# Patient Record
Sex: MALE | Race: WHITE | NOT HISPANIC OR LATINO | Employment: FULL TIME | URBAN - METROPOLITAN AREA
[De-identification: names, ages, dates, MRNs, and addresses within clinical notes are randomized per-mention and may not be internally consistent; named-entity substitution may affect disease eponyms.]

---

## 2022-11-03 ENCOUNTER — HOSPITAL ENCOUNTER (EMERGENCY)
Facility: HOSPITAL | Age: 42
Discharge: ANOTHER HOSPITAL | End: 2022-11-04
Attending: GENERAL ACUTE CARE HOSPITAL
Payer: MEDICAID

## 2022-11-03 DIAGNOSIS — L03.211 FACIAL CELLULITIS: Primary | ICD-10-CM

## 2022-11-03 LAB
ALBUMIN SERPL-MCNC: 3.8 GM/DL (ref 3.5–5)
ALBUMIN/GLOB SERPL: 0.9 RATIO (ref 1.1–2)
ALP SERPL-CCNC: 101 UNIT/L (ref 40–150)
ALT SERPL-CCNC: 18 UNIT/L (ref 0–55)
AST SERPL-CCNC: 17 UNIT/L (ref 5–34)
BASOPHILS # BLD AUTO: 0.06 X10(3)/MCL (ref 0–0.2)
BASOPHILS NFR BLD AUTO: 0.5 %
BILIRUBIN DIRECT+TOT PNL SERPL-MCNC: 0.4 MG/DL
BUN SERPL-MCNC: 15 MG/DL (ref 8.9–20.6)
CALCIUM SERPL-MCNC: 10 MG/DL (ref 8.4–10.2)
CHLORIDE SERPL-SCNC: 101 MMOL/L (ref 98–107)
CO2 SERPL-SCNC: 26 MMOL/L (ref 22–29)
CREAT SERPL-MCNC: 0.86 MG/DL (ref 0.73–1.18)
EOSINOPHIL # BLD AUTO: 0.13 X10(3)/MCL (ref 0–0.9)
EOSINOPHIL NFR BLD AUTO: 1 %
ERYTHROCYTE [DISTWIDTH] IN BLOOD BY AUTOMATED COUNT: 11.7 % (ref 11.5–17)
ERYTHROCYTE [SEDIMENTATION RATE] IN BLOOD: 18 MM/HR (ref 0–15)
GFR SERPLBLD CREATININE-BSD FMLA CKD-EPI: >60 MLS/MIN/1.73/M2
GLOBULIN SER-MCNC: 4.2 GM/DL (ref 2.4–3.5)
GLUCOSE SERPL-MCNC: 109 MG/DL (ref 74–100)
HCT VFR BLD AUTO: 43.2 % (ref 42–52)
HGB BLD-MCNC: 14.9 GM/DL (ref 14–18)
IMM GRANULOCYTES # BLD AUTO: 0.07 X10(3)/MCL (ref 0–0.04)
IMM GRANULOCYTES NFR BLD AUTO: 0.6 %
LACTATE SERPL-SCNC: 1.1 MMOL/L (ref 0.5–2.2)
LYMPHOCYTES # BLD AUTO: 1.53 X10(3)/MCL (ref 0.6–4.6)
LYMPHOCYTES NFR BLD AUTO: 12.2 %
MCH RBC QN AUTO: 31.7 PG (ref 27–31)
MCHC RBC AUTO-ENTMCNC: 34.5 MG/DL (ref 33–36)
MCV RBC AUTO: 91.9 FL (ref 80–94)
MONOCYTES # BLD AUTO: 1.12 X10(3)/MCL (ref 0.1–1.3)
MONOCYTES NFR BLD AUTO: 8.9 %
NEUTROPHILS # BLD AUTO: 9.7 X10(3)/MCL (ref 2.1–9.2)
NEUTROPHILS NFR BLD AUTO: 76.8 %
PLATELET # BLD AUTO: 244 X10(3)/MCL (ref 130–400)
PMV BLD AUTO: 8.7 FL (ref 7.4–10.4)
POTASSIUM SERPL-SCNC: 3.7 MMOL/L (ref 3.5–5.1)
PROT SERPL-MCNC: 8 GM/DL (ref 6.4–8.3)
RBC # BLD AUTO: 4.7 X10(6)/MCL (ref 4.7–6.1)
SODIUM SERPL-SCNC: 139 MMOL/L (ref 136–145)
WBC # SPEC AUTO: 12.6 X10(3)/MCL (ref 4.5–11.5)

## 2022-11-03 PROCEDURE — 85025 COMPLETE CBC W/AUTO DIFF WBC: CPT | Performed by: GENERAL ACUTE CARE HOSPITAL

## 2022-11-03 PROCEDURE — 83605 ASSAY OF LACTIC ACID: CPT | Performed by: GENERAL ACUTE CARE HOSPITAL

## 2022-11-03 PROCEDURE — 25000003 PHARM REV CODE 250: Performed by: GENERAL ACUTE CARE HOSPITAL

## 2022-11-03 PROCEDURE — 85651 RBC SED RATE NONAUTOMATED: CPT | Performed by: GENERAL ACUTE CARE HOSPITAL

## 2022-11-03 PROCEDURE — 80053 COMPREHEN METABOLIC PANEL: CPT | Performed by: GENERAL ACUTE CARE HOSPITAL

## 2022-11-03 PROCEDURE — 96365 THER/PROPH/DIAG IV INF INIT: CPT

## 2022-11-03 PROCEDURE — 87040 BLOOD CULTURE FOR BACTERIA: CPT | Performed by: GENERAL ACUTE CARE HOSPITAL

## 2022-11-03 PROCEDURE — 96375 TX/PRO/DX INJ NEW DRUG ADDON: CPT

## 2022-11-03 PROCEDURE — 99285 EMERGENCY DEPT VISIT HI MDM: CPT | Mod: 25

## 2022-11-03 PROCEDURE — 86141 C-REACTIVE PROTEIN HS: CPT | Performed by: GENERAL ACUTE CARE HOSPITAL

## 2022-11-03 PROCEDURE — 36415 COLL VENOUS BLD VENIPUNCTURE: CPT | Performed by: GENERAL ACUTE CARE HOSPITAL

## 2022-11-03 RX ORDER — CLINDAMYCIN PHOSPHATE 600 MG/50ML
600 INJECTION, SOLUTION INTRAVENOUS
Status: COMPLETED | OUTPATIENT
Start: 2022-11-03 | End: 2022-11-04

## 2022-11-03 RX ADMIN — CLINDAMYCIN PHOSPHATE 600 MG: 600 INJECTION, SOLUTION INTRAVENOUS at 11:11

## 2022-11-04 ENCOUNTER — HOSPITAL ENCOUNTER (INPATIENT)
Facility: HOSPITAL | Age: 42
LOS: 2 days | Discharge: HOME | DRG: 603 | End: 2022-11-06
Attending: INTERNAL MEDICINE | Admitting: INTERNAL MEDICINE
Payer: MEDICAID

## 2022-11-04 VITALS
WEIGHT: 216 LBS | SYSTOLIC BLOOD PRESSURE: 127 MMHG | RESPIRATION RATE: 18 BRPM | TEMPERATURE: 99 F | DIASTOLIC BLOOD PRESSURE: 82 MMHG | OXYGEN SATURATION: 99 % | HEIGHT: 73 IN | HEART RATE: 90 BPM | BODY MASS INDEX: 28.63 KG/M2

## 2022-11-04 DIAGNOSIS — L03.213 PERIORBITAL CELLULITIS OF RIGHT EYE: Primary | ICD-10-CM

## 2022-11-04 DIAGNOSIS — L03.811 CELLULITIS OF POSTAURICULAR REGION: ICD-10-CM

## 2022-11-04 DIAGNOSIS — R07.9 CHEST PAIN: ICD-10-CM

## 2022-11-04 DIAGNOSIS — L03.213 PRESEPTAL CELLULITIS OF RIGHT EYE: ICD-10-CM

## 2022-11-04 DIAGNOSIS — L03.213 PERIORBITAL CELLULITIS: ICD-10-CM

## 2022-11-04 LAB
ALBUMIN SERPL-MCNC: 3.6 GM/DL (ref 3.5–5)
ALBUMIN/GLOB SERPL: 1.2 RATIO (ref 1.1–2)
ALP SERPL-CCNC: 96 UNIT/L (ref 40–150)
ALT SERPL-CCNC: 23 UNIT/L (ref 0–55)
AST SERPL-CCNC: 19 UNIT/L (ref 5–34)
BASOPHILS # BLD AUTO: 0.05 X10(3)/MCL (ref 0–0.2)
BASOPHILS NFR BLD AUTO: 0.5 %
BILIRUBIN DIRECT+TOT PNL SERPL-MCNC: 0.4 MG/DL
BUN SERPL-MCNC: 15.9 MG/DL (ref 8.9–20.6)
CALCIUM SERPL-MCNC: 9.1 MG/DL (ref 8.4–10.2)
CHLORIDE SERPL-SCNC: 101 MMOL/L (ref 98–107)
CO2 SERPL-SCNC: 27 MMOL/L (ref 22–29)
CREAT SERPL-MCNC: 0.76 MG/DL (ref 0.73–1.18)
CRP SERPL HS-MCNC: 76.72 MG/L
CRP SERPL-MCNC: 79.5 MG/L
EOSINOPHIL # BLD AUTO: 0.28 X10(3)/MCL (ref 0–0.9)
EOSINOPHIL NFR BLD AUTO: 3.1 %
ERYTHROCYTE [DISTWIDTH] IN BLOOD BY AUTOMATED COUNT: 11.7 % (ref 11.5–17)
EST. AVERAGE GLUCOSE BLD GHB EST-MCNC: 102.5 MG/DL
FLUAV AG UPPER RESP QL IA.RAPID: NOT DETECTED
FLUBV AG UPPER RESP QL IA.RAPID: NOT DETECTED
GFR SERPLBLD CREATININE-BSD FMLA CKD-EPI: >60 MLS/MIN/1.73/M2
GLOBULIN SER-MCNC: 2.9 GM/DL (ref 2.4–3.5)
GLUCOSE SERPL-MCNC: 113 MG/DL (ref 74–100)
HAV IGM SERPL QL IA: NONREACTIVE
HBA1C MFR BLD: 5.2 %
HBV CORE IGM SERPL QL IA: NONREACTIVE
HBV SURFACE AG SERPL QL IA: NONREACTIVE
HCT VFR BLD AUTO: 40.4 % (ref 42–52)
HCV AB SERPL QL IA: NONREACTIVE
HGB BLD-MCNC: 13.8 GM/DL (ref 14–18)
HIV 1+2 AB+HIV1 P24 AG SERPL QL IA: NONREACTIVE
IMM GRANULOCYTES # BLD AUTO: 0.05 X10(3)/MCL (ref 0–0.04)
IMM GRANULOCYTES NFR BLD AUTO: 0.5 %
LYMPHOCYTES # BLD AUTO: 1.56 X10(3)/MCL (ref 0.6–4.6)
LYMPHOCYTES NFR BLD AUTO: 17 %
MAGNESIUM SERPL-MCNC: 2.4 MG/DL (ref 1.6–2.6)
MCH RBC QN AUTO: 31.1 PG (ref 27–31)
MCHC RBC AUTO-ENTMCNC: 34.2 MG/DL (ref 33–36)
MCV RBC AUTO: 91 FL (ref 80–94)
MONOCYTES # BLD AUTO: 1.01 X10(3)/MCL (ref 0.1–1.3)
MONOCYTES NFR BLD AUTO: 11 %
MRSA PCR SCRN (OHS): DETECTED
NEUTROPHILS # BLD AUTO: 6.2 X10(3)/MCL (ref 2.1–9.2)
NEUTROPHILS NFR BLD AUTO: 67.9 %
NRBC BLD AUTO-RTO: 0 %
PHOSPHATE SERPL-MCNC: 3.1 MG/DL (ref 2.3–4.7)
PLATELET # BLD AUTO: 233 X10(3)/MCL (ref 130–400)
PMV BLD AUTO: 8.9 FL (ref 7.4–10.4)
POTASSIUM SERPL-SCNC: 3.8 MMOL/L (ref 3.5–5.1)
PROT SERPL-MCNC: 6.5 GM/DL (ref 6.4–8.3)
RBC # BLD AUTO: 4.44 X10(6)/MCL (ref 4.7–6.1)
SARS-COV-2 RNA RESP QL NAA+PROBE: NOT DETECTED
SODIUM SERPL-SCNC: 140 MMOL/L (ref 136–145)
T PALLIDUM AB SER QL: NONREACTIVE
T4 FREE SERPL-MCNC: 0.93 NG/DL (ref 0.7–1.48)
TSH SERPL-ACNC: 1.94 UIU/ML (ref 0.35–4.94)
WBC # SPEC AUTO: 9.2 X10(3)/MCL (ref 4.5–11.5)

## 2022-11-04 PROCEDURE — 87070 CULTURE OTHR SPECIMN AEROBIC: CPT

## 2022-11-04 PROCEDURE — 63600175 PHARM REV CODE 636 W HCPCS: Performed by: INTERNAL MEDICINE

## 2022-11-04 PROCEDURE — 80053 COMPREHEN METABOLIC PANEL: CPT

## 2022-11-04 PROCEDURE — 25000003 PHARM REV CODE 250: Performed by: STUDENT IN AN ORGANIZED HEALTH CARE EDUCATION/TRAINING PROGRAM

## 2022-11-04 PROCEDURE — 36415 COLL VENOUS BLD VENIPUNCTURE: CPT | Performed by: INTERNAL MEDICINE

## 2022-11-04 PROCEDURE — 25000003 PHARM REV CODE 250

## 2022-11-04 PROCEDURE — 87641 MR-STAPH DNA AMP PROBE: CPT

## 2022-11-04 PROCEDURE — 25000003 PHARM REV CODE 250: Performed by: INTERNAL MEDICINE

## 2022-11-04 PROCEDURE — 63600175 PHARM REV CODE 636 W HCPCS: Performed by: STUDENT IN AN ORGANIZED HEALTH CARE EDUCATION/TRAINING PROGRAM

## 2022-11-04 PROCEDURE — 63600175 PHARM REV CODE 636 W HCPCS

## 2022-11-04 PROCEDURE — 63600175 PHARM REV CODE 636 W HCPCS: Performed by: GENERAL ACUTE CARE HOSPITAL

## 2022-11-04 PROCEDURE — 27000207 HC ISOLATION

## 2022-11-04 PROCEDURE — 84100 ASSAY OF PHOSPHORUS: CPT

## 2022-11-04 PROCEDURE — 83036 HEMOGLOBIN GLYCOSYLATED A1C: CPT

## 2022-11-04 PROCEDURE — 11000001 HC ACUTE MED/SURG PRIVATE ROOM

## 2022-11-04 PROCEDURE — 83735 ASSAY OF MAGNESIUM: CPT

## 2022-11-04 PROCEDURE — 87389 HIV-1 AG W/HIV-1&-2 AB AG IA: CPT

## 2022-11-04 PROCEDURE — 84439 ASSAY OF FREE THYROXINE: CPT

## 2022-11-04 PROCEDURE — 80074 ACUTE HEPATITIS PANEL: CPT

## 2022-11-04 PROCEDURE — 86780 TREPONEMA PALLIDUM: CPT

## 2022-11-04 PROCEDURE — 85025 COMPLETE CBC W/AUTO DIFF WBC: CPT

## 2022-11-04 PROCEDURE — 0241U COVID/FLU A&B PCR: CPT | Performed by: GENERAL ACUTE CARE HOSPITAL

## 2022-11-04 PROCEDURE — 84443 ASSAY THYROID STIM HORMONE: CPT

## 2022-11-04 PROCEDURE — 86140 C-REACTIVE PROTEIN: CPT | Performed by: STUDENT IN AN ORGANIZED HEALTH CARE EDUCATION/TRAINING PROGRAM

## 2022-11-04 RX ORDER — IBUPROFEN 200 MG
16 TABLET ORAL
Status: DISCONTINUED | OUTPATIENT
Start: 2022-11-04 | End: 2022-11-06 | Stop reason: HOSPADM

## 2022-11-04 RX ORDER — GLUCAGON 1 MG
1 KIT INJECTION
Status: DISCONTINUED | OUTPATIENT
Start: 2022-11-04 | End: 2022-11-06 | Stop reason: HOSPADM

## 2022-11-04 RX ORDER — SODIUM CHLORIDE 0.9 % (FLUSH) 0.9 %
10 SYRINGE (ML) INJECTION EVERY 12 HOURS PRN
Status: CANCELLED | OUTPATIENT
Start: 2022-11-04

## 2022-11-04 RX ORDER — CLINDAMYCIN PHOSPHATE 600 MG/50ML
600 INJECTION, SOLUTION INTRAVENOUS
Status: DISCONTINUED | OUTPATIENT
Start: 2022-11-04 | End: 2022-11-06 | Stop reason: HOSPADM

## 2022-11-04 RX ORDER — SODIUM CHLORIDE 0.9 % (FLUSH) 0.9 %
10 SYRINGE (ML) INJECTION EVERY 12 HOURS PRN
Status: DISCONTINUED | OUTPATIENT
Start: 2022-11-04 | End: 2022-11-06 | Stop reason: HOSPADM

## 2022-11-04 RX ORDER — MUPIROCIN 20 MG/G
OINTMENT TOPICAL DAILY
Status: DISCONTINUED | OUTPATIENT
Start: 2022-11-04 | End: 2022-11-06 | Stop reason: HOSPADM

## 2022-11-04 RX ORDER — NALOXONE HCL 0.4 MG/ML
0.02 VIAL (ML) INJECTION
Status: DISCONTINUED | OUTPATIENT
Start: 2022-11-04 | End: 2022-11-06 | Stop reason: HOSPADM

## 2022-11-04 RX ORDER — KETOROLAC TROMETHAMINE 30 MG/ML
30 INJECTION, SOLUTION INTRAMUSCULAR; INTRAVENOUS
Status: COMPLETED | OUTPATIENT
Start: 2022-11-04 | End: 2022-11-04

## 2022-11-04 RX ORDER — SODIUM CHLORIDE 9 MG/ML
2000 INJECTION, SOLUTION INTRAVENOUS CONTINUOUS
Status: DISCONTINUED | OUTPATIENT
Start: 2022-11-04 | End: 2022-11-04

## 2022-11-04 RX ORDER — IBUPROFEN 200 MG
24 TABLET ORAL
Status: DISCONTINUED | OUTPATIENT
Start: 2022-11-04 | End: 2022-11-06 | Stop reason: HOSPADM

## 2022-11-04 RX ADMIN — SODIUM CHLORIDE 2000 ML: 9 INJECTION, SOLUTION INTRAVENOUS at 06:11

## 2022-11-04 RX ADMIN — CLINDAMYCIN PHOSPHATE 600 MG: 600 INJECTION, SOLUTION INTRAVENOUS at 03:11

## 2022-11-04 RX ADMIN — VANCOMYCIN HYDROCHLORIDE 1000 MG: 1 INJECTION, POWDER, LYOPHILIZED, FOR SOLUTION INTRAVENOUS at 08:11

## 2022-11-04 RX ADMIN — CLINDAMYCIN PHOSPHATE 600 MG: 600 INJECTION, SOLUTION INTRAVENOUS at 06:11

## 2022-11-04 RX ADMIN — SODIUM CHLORIDE 1 G: 900 INJECTION INTRAVENOUS at 09:11

## 2022-11-04 RX ADMIN — KETOROLAC TROMETHAMINE 30 MG: 30 INJECTION, SOLUTION INTRAMUSCULAR at 01:11

## 2022-11-04 RX ADMIN — MUPIROCIN: 20 OINTMENT TOPICAL at 09:11

## 2022-11-04 RX ADMIN — CLINDAMYCIN PHOSPHATE 600 MG: 600 INJECTION, SOLUTION INTRAVENOUS at 09:11

## 2022-11-04 RX ADMIN — VANCOMYCIN HYDROCHLORIDE 2000 MG: 1 INJECTION, POWDER, LYOPHILIZED, FOR SOLUTION INTRAVENOUS at 10:11

## 2022-11-04 NOTE — CARE UPDATE
"Resident Attestation:   Note made in conjunction with Dr. Kin Cueva, agree with assessment and plan as indicated in the History and Physical with included inclusions and addendum. Of note, the patient is a 42 y.o. male with PMH of recurrent nephrolithiasis (s/p B/L lithotripsy) and previous MRSA infection (right forearm) who presented to outside facility yesterday (11/03/2022) with CC of right-sided facial swelling/pain.  He reports this began as a "boil" last Wednesday (10/26) with associated swelling and pain that ruptured on Friday (10/28) with non malodorous purulent material.  Patient states he cleaned the area with hydrogen peroxide which did seem to initially heal.  Over the course of the weekend and leading up until Monday (10/31) extension of the redness/swelling/pain across his right temple and orbital region with associated redness of right eye.  The pain has seemed to resolve the past few days though redness/swelling has persisted.  Over the course of these events; patient denies any headache, lightheadedness dizziness, fevers/chills, nausea/vomiting, right-sided facial paresthesias, blurry vision, double vision, loss of vision, cloudy vision, pain with ocular movements, loss of hearing, nasal congestion, sore throat, recent illness or sick contacts, difficulty breathing, dysphagia, chest pain, or shortness of breath.  He denies ever experiencing facial swelling in the past but states he did have MRSA infection to right forearm many years ago that required unspecified procedure.  Denies any recent travel, trauma, insect bite, or puncture to the site of concern.     ROS:  10 point ROS performed and negative other than stated above.    Physical Exam:  General:  Well developed, well nourished gentleman, in no acute distress  HEENT: Erythema & Swelling expanding from right postauricular to right orbital region.  Conjunctival injection to right eye. PERRL. EOMI. No pain with EOM. No proptosis. No crepitus " "or below appreciated.  Nontender to palpation.  Area of fluctuance appreciated at right temple region.  Ulceration or purulent drainage.  Hair thinning/lack of hair to surrounding area.  Neck: supple, normal ROM, no JVD  CVS:  RRR. S1 and S2 normal. No murmurs, rubs, or gallops.  2+ peripheral pulses.  No appreciable B/L lower extremity edema  Resp:  Lungs clear to auscultation bilaterally, no wheezes, rales, or rhonchi. Normal respiratory effort.  GI:  Abdomen soft, non-tender, non-distended, normoactive bowel sounds  Neuro:  Alert and oriented x3, No focal neuro deficits, CNII-XII grossly intact    Pictures Obtained Upon Arrival at [facility]            Assessment and Plan:  Suspected Pre-Septal Cellulitis  Post-Auricular Cellulitis  SIRS (2/4; tachycardia, leukocytosis)  Hx of MRSA Infection  - x1.5 weeks worsening right sided, post-auricular to nayeli-orbital pain/swelling/erythema after rupture of purulent-puss filled "boil"  - CT Maxillofacial w/out Contrast (11/3):  Moderate right facial soft tissue swelling including preseptal and periorbital soft tissues off reflecting cellulitis without discrete fluid collection identified  - Low Suspicion for Orbital Cellulitis as patient without proptosis, ocular pain, reduced vision, diplopia, or orbital abscess on CT  - Low suspicion for Shingles as lesion is non-vesicular and seems to slightly crossing midline  - Low suspicion for nec fasc as no bullae/crepitus on exam & pain w/in proportion  - ESR (18), Pending CRP  - Pending Blood Cultures x2, MRSA PCR  - Clindamycin & Rocephin abx coverage (day 1)  - NS @ 75 cc/hr as patient euvolemic on exam & lactic acid WNL w/ low suspicion for sepsis  - Also pending:   -- HIV, Hepatitis Panel, Syphilis, A1c, Lipid Panel  - Patient may benefit from ophthalmology consultation if condition worsens  - Wound care consulted for possible culture as temporal lesion appears fluctuant & may be able to obtain wound culture       Travis Barakat, " MD  Internal Medicine PGY-2

## 2022-11-04 NOTE — H&P
History and Physical     Date of Admit: 11/4/2022  Current Hospital Day: 1     Subjective:      Brief HPI:  Jude Lejeune is a 42 y.o. male who  has no past medical history on file.  He was transferred from [facility] to [facility] on 11/4/2022  with a primary complaint of redness and swelling to right eye x2 days. States about a week ago he had a boil behind right ear, he popped it draining purulent discharge and it has slowly worsened. Now he has a second boil to right scalp and erythema and swelling along scalp and to eyelid. No visual changes, no eye pain, no pain with extraocular movements, no photophobia. No fever, HA,N/V/D, cough, congestion, rhinorrhea or other sx. Denies recent travel, known sick contacts, insect/animal bites, hiking. Pt states he smokes about 0.5 PPD x 8 years. Drinks alcohol less than twice a month. No illicit substance use. Reports prior hx of MRSA to [facility]. No prior tx.        At outside facility Had CT maxillofacial without contrast yesterday Moderate right facial soft tissue swelling . Sed rate 18, normal lactate.     No past medical history on file.    Past Surgical History:   Procedure Laterality Date    KIDNEY STONE SURGERY         Review of patient's allergies indicates:  No Known Allergies    No current outpatient medications     Family History    None         Tobacco Use    Smoking status: 0.5 PPD    Smokeless tobacco: Not on file   Substance and Sexual Activity    Alcohol use: About once a month    Drug use: None    Sexual activity: Not on file        Review of Systems:  12 point ROS completed and negative except as indicated above.     Objective:     Vital Signs:  Vital Signs (Most Recent):  Temp: 98.6 °F (37 °C) (11/04/22 0400)  Pulse: 88 (11/04/22 0400)  Resp: 16 (11/04/22 0400)  BP: (!) 143/76 (11/04/22 0400)  SpO2: 98 % (11/04/22 0400) Vital Signs (24h Range):  Temp:  [98.6 °F (37 °C)-99 °F (37.2 °C)] 98.6 °F (37 °C)  Pulse:  [] 88  Resp:  [16-18] 16  SpO2:  [98 %-99 %] 98  %  BP: (126-143)/(76-93) 143/76   Body mass index is 28.74 kg/m².   No intake or output data in the 24 hours ending 11/04/22 0549    Physical Examination:  General:  Well developed, well nourished, no acute distress  HEENT: Normocephalic, atraumatic. Swelling and redness to right eyelids. Conjunctival injection to right eye. PERRL. EOMI. No pain with EOMI. No proptosis. No crep. There is some crusting to the eyelid.   Neck: supple, normal ROM, no JVD  CVS:  RRR. S1 and S2 normal. No murmurs, rubs, or gallops. Regular peripheral pulses. No peripheral edema  Resp:  Lungs clear to auscultation bilaterally, no wheezes, rales, or rhonchi. Normal respiratory effort.  GI:  Abdomen soft, non-tender, non-distended, normoactive bowel sounds  MSK:  Full range of motion, no obvious deformities  Neuro:  Alert and oriented x3, No focal neuro deficits, CNII-XII grossly intact  Skin:  area of induration behind right ear and superolateral to right eye. Small amount of fluctuance noted to the one nearer the eye. No active drainage. Erythema to surrounding area. Some hair thinning to nearby area.                 Laboratory:    Recent Labs   Lab 11/03/22 2251   WBC 12.6*   HGB 14.9   HCT 43.2      MCV 91.9   RDW 11.7     No results for input(s): TROPONINI, CKTOTAL, CKMB, BNP in the last 24 hours.  No results for input(s): TROPONINI, CKTOTAL, CKMB, BNP in the last 168 hours.  No results for input(s): CHOL, HDL, LDLCALC, TRIG, CHOLHDL in the last 168 hours. Recent Labs   Lab 11/03/22 2251      K 3.7   CO2 26   BUN 15.0   CREATININE 0.86   CALCIUM 10.0     Recent Labs   Lab 11/03/22 2251   ALBUMIN 3.8   BILITOT 0.4   AST 17   ALKPHOS 101   ALT 18     No results for input(s): IRON, TIBC, FERRITIN, SATURATEDIRO, [id number], FOLATE in the last 168 hours.  No results for input(s): TSH, Z5MTNBE, HGBA1C, INR, PROTIME, PTT in the last 168 hours.       Microbiology Data:  Microbiology Results (last 7 days)       ** No results  found for the last 168 hours. **               Antibiotics and Day Number of Therapy:  Antibiotics (From admission, onward)      Start     Stop Route Frequency Ordered    11/04/22 0645  cefTRIAXone (ROCEPHIN) 1 g in sodium chloride 0.9 % 50 mL IVPB (MB+)         -- IV Every 24 hours (non-standard times) 11/04/22 0536    11/04/22 0600  clindamycin in D5W 600 mg/50 mL IVPB 600 mg         -- IV Every 8 hours (non-standard times) 11/04/22 0452                 Assessment & Plan:     Pre-septal Cellulitis, hx of MRSA  Post auricular cellulitis  Leukocytosis, left shift  SIRS 2/4 on presentation to outside facility  -Started clinda 600mg q8hr, rocephin 1g q24  - hiv, syphilis, hepatitis, MRSA screening ordered.  -CT at outside facility: Moderate right facial soft tissue swelling, no drainable collection.   -Blood cultures drawn at outside facility, pending.   - sed rate 18,  - normal lactate.  - consult to wound care  - low suspicioun for shingles, nonvessicluar lesion.   - optho consult placed.        CODE STATUS: full  Access: piv  Antibiotics: clinda, rocephin  Diet: regular  DVT Prophylaxis: SCD  GI Prophylaxis: none  Fluids: 75cc      Disposition: stable admitted for obs.        Kin Cueva DO  Internal Medicine Resident PGY-I      Attending addendum to follow

## 2022-11-04 NOTE — ED PROVIDER NOTES
"Encounter Date: 11/3/2022       History     Chief Complaint   Patient presents with    Eye Problem     C/o R eye swelling and pain, states had a "boil" on that side of his head a week ago and the redness and swelling to the eye strated 2 days ago, denies any fever or vision changes     Patient came in emergency room with chief complaints on the right eye right head side pain, redness, swelling for 3 days.  He reports that symptoms started from red, painful bump behind his right ear and slowly spread at the front to right temporal area right forehead and right periorbital area he has no pain with right eyeball movement, denies fever, chills, no previous history of MRSA    Review of patient's allergies indicates:  No Known Allergies  History reviewed. No pertinent past medical history.  Past Surgical History:   Procedure Laterality Date    KIDNEY STONE SURGERY       History reviewed. No pertinent family history.     Review of Systems   Skin:  Positive for color change, rash and wound.   All other systems reviewed and are negative.    Physical Exam     Initial Vitals [11/03/22 1920]   BP Pulse Resp Temp SpO2   126/78 (!) 119 18 99 °F (37.2 °C) 98 %      MAP       --         Physical Exam    Nursing note and vitals reviewed.  Constitutional: He appears well-developed and well-nourished.   HENT:   Head: Normocephalic.   Right Ear: External ear normal.   Left Ear: External ear normal.   Mouth/Throat: Oropharynx is clear and moist.   Eyes: EOM are normal.   Right superior orbital erythema with edema, able to open the right eye   Neck: Neck supple.   Normal range of motion.  Cardiovascular:  Normal heart sounds.   Tachycardia present.         Abdominal: Abdomen is soft.   Musculoskeletal:         General: Normal range of motion.      Cervical back: Normal range of motion and neck supple.     Neurological: He is alert and oriented to person, place, and time.   Skin: Skin is warm. Abrasion noted. No lesion noted. There is " erythema.        The team a wheeze in duration spreading from the stair side of right ear, through right temple or area, right periorbital area includes upper and lower eyelids, there are 2 healing skin abrasions (1st at posterior side of right ear, a 2nd at right temporal area), there is no fluctuation, but skin headers of days in duration   Psychiatric: He has a normal mood and affect.       ED Course   Procedures  Labs Reviewed   COMPREHENSIVE METABOLIC PANEL - Abnormal; Notable for the following components:       Result Value    Glucose Level 109 (*)     Globulin 4.2 (*)     Albumin/Globulin Ratio 0.9 (*)     All other components within normal limits   SEDIMENTATION RATE, AUTOMATED - Abnormal; Notable for the following components:    Sed Rate 18 (*)     All other components within normal limits   CBC WITH DIFFERENTIAL - Abnormal; Notable for the following components:    WBC 12.6 (*)     MCH 31.7 (*)     Neut # 9.7 (*)     IG# 0.07 (*)     All other components within normal limits   LACTIC ACID, PLASMA - Normal   COVID/FLU A&B PCR - Normal    Narrative:     The Xpert Xpress SARS-CoV-2/FLU/RSV plus is a rapid, multiplexed real-time PCR test intended for the simultaneous qualitative detection and differentiation of SARS-CoV-2, Influenza A, Influenza B, and respiratory syncytial virus (RSV) viral RNA in either nasopharyngeal swab or nasal swab specimens.         BLOOD CULTURE OLG   BLOOD CULTURE OLG   CBC W/ AUTO DIFFERENTIAL    Narrative:     The following orders were created for panel order CBC auto differential.  Procedure                               Abnormality         Status                     ---------                               -----------         ------                     CBC with Differential[[id number]]        Abnormal            Final result                 Please view results for these tests on the individual orders.   HIGH SENSITIVITY CRP          Imaging Results              CT Maxillofacial  Without Contrast (Preliminary result)  Result time 11/03/22 22:56:02      Preliminary result by Chuy Liriano Jr., MD (11/03/22 22:56:02)                   Narrative:    START OF REPORT:  Technique: Noncontrast maxillofacial CT was performed with axial as well as sagittal and coronal images being submitted for interpretation.    Comparison: None.    Clinical history: Facial swelling.    Findings:  Facial soft tissues: Moderate right facial soft tissue swelling is seen including preseptal and periorbital soft tissues. This likely reflects cellulitis if no trauma. No discrete fluid collection is identified.  Orbital soft tissues: The intraorbital soft tissues appear unremarkable.  Bones:  Orbital bony structures: The bilateral orbital bony structures are intact with no orbital fracture identified.  Mandible: The mandible appears unremarkable.  Maxilla: The maxilla appears unremarkable.  Zygoma: The zygomatic arches are intact.  TMJ: The mandibular condyles appear normally placed with respect to the mandibular fossa.  Nasal Bones: The nasal septum is midline.  Paranasal sinuses: A small retention cyst or polyp is seen in the left maxillary sinus.  Mastoid air cells: The visualized mastoid air cells appear clear.  Brain: The visualized intracranial structures appear unremarkable.      Impression:  1. Moderate right facial soft tissue swelling is seen including preseptal and periorbital soft tissues. This likely reflects cellulitis if no trauma. No discrete fluid collection is identified.  2. Details as above.                          Preliminary result by Interface, Rad Results In (11/03/22 22:56:02)                   Narrative:    START OF REPORT:  Technique: Noncontrast maxillofacial CT was performed with axial as well as sagittal and coronal images being submitted for interpretation.    Comparison: None.    Clinical history: Facial swelling.    Findings:  Facial soft tissues: Moderate right facial soft tissue  swelling is seen including preseptal and periorbital soft tissues. This likely reflects cellulitis if no trauma. No discrete fluid collection is identified.  Orbital soft tissues: The intraorbital soft tissues appear unremarkable.  Bones:  Orbital bony structures: The bilateral orbital bony structures are intact with no orbital fracture identified.  Mandible: The mandible appears unremarkable.  Maxilla: The maxilla appears unremarkable.  Zygoma: The zygomatic arches are intact.  TMJ: The mandibular condyles appear normally placed with respect to the mandibular fossa.  Nasal Bones: The nasal septum is midline.  Paranasal sinuses: A small retention cyst or polyp is seen in the left maxillary sinus.  Mastoid air cells: The visualized mastoid air cells appear clear.  Brain: The visualized intracranial structures appear unremarkable.      Impression:  1. Moderate right facial soft tissue swelling is seen including preseptal and periorbital soft tissues. This likely reflects cellulitis if no trauma. No discrete fluid collection is identified.  2. Details as above.                                         Medications   clindamycin in D5W 600 mg/50 mL IVPB 600 mg (0 mg Intravenous Stopped 11/4/22 0018)   ketorolac injection 30 mg (30 mg Intravenous Given 11/4/22 0132)     Medical Decision Making:   Initial Assessment:   Patient given emergency room with chief complaints of swelling of right eye for 3 days, physical exam revealed significant erythema with edema and induration of right-side of head spreading from right posterior area to right periorbital areas, patient does not look toxic, no fever, looks like cellulitis  Differential Diagnosis:   Facial Cellulitis, periorbital cellulitis  Clinical Tests:   Lab Tests: Ordered and Reviewed  The following lab test(s) were unremarkable: CBC and CMP       <> Summary of Lab: Patient has leukocytosis 12.6, normal lactic acid  ED Management:  Patient has facial cellulitis, received  Rocephin 1 g, requires IV antibiotics           ED Course as of 11/04/22 0230   u Nov 03, 2022   2339 Patient has very severe facial cellulitis including periorbital area, requires IV antibiotics, patient agreed to stay in the hospital for anti the antibiotics, [facility] currently has no available beds for admission. [IP]   Fri Nov 04, 2022   0130 Patient's case was discussed with ER [facility] physician (Dr. Ashley), COVID is test is requested, ER physician will call back after we will discuss with Hospital administration possible transfer [IP]   0157 Patient's case was discussed with resident (patient try to be admitted for ) [IP]   0230 Received phone call from [facility], patient was accepted [IP]      ED Course User Index  [IP] Roxy Bangura MD                   Clinical Impression:   Final diagnoses:  [L03.211] Facial cellulitis (Primary)      ED Disposition Condition    Transfer to Another Facility Stable                Roxy Bangura MD  11/04/22 0942

## 2022-11-04 NOTE — CONSULTS
Consultation Report  Ophthalmology Service    Date: 11/04/2022    Chief complaint/Reason for Consult: preseptal cellulitis     History of Present Illness: Jude Lejeune is a 42 y.o. male who presents with as transfer for admission with 3 days hx of ruptured boils behind right ear and on right temple.  He self-reported hx of prior cutaneous MRSA infection right arm.  His only ophthalmic complaint is lid swelling blocking part of view.  He denies pain with EOM, denies diplopia.  Denies vision changes.  The patient denies flashes (like a camera going off), excessive floaters, and/or sheets/curtain blocking part of view. Denies recent URI symptoms or fever.      POcularHx: No history of ocular problems or past ocular surgeries.  Does not use glasses or contacts.    Current eye gtts: none      PMHx:  has no past medical history on file.     PSurgHx:  has a past surgical history that includes Kidney stone surgery.     Home Medications:   Prior to Admission medications    Not on File        Medications this encounter:    clindamycin (CLEOCIN) IVPB  600 mg Intravenous Q8H    mupirocin   Topical (Top) Daily    vancomycin (VANCOCIN) IVPB  1,000 mg Intravenous Q8H       Allergies: has No Known Allergies.     Social:       Family Hx: No family history of glaucoma, macular degeneration, or blindness. family history is not on file.     ROS: Negative x 10 except for complaints as described in HPI; negative for fever, chills, weight loss, nausea, vomiting, diarrhea, shortness of breath, nasal discharge, cough, abdominal pain, dyspnea, difficulty moving arms and legs, confusion, dysuria, palpitations, or chest pain     Ocular examination/Dilated fundus examination:  Base Eye Exam       Visual Acuity (Snellen - Linear)         Right Left    Near sc 20/20 20/20              Tonometry (Tonopen, 5:28 PM)         Right Left    Pressure 14 17              Pupils         APD    Right None    Left None              Visual Fields          Right Left     Full Full              Extraocular Movement         Right Left     Full, Ortho Full, Ortho              Dilation       Both eyes: 1% Mydriacyl, 2.5% Phenylephrine @ 5:27 PM                  Additional Tests       Color         Right Left    Ishihara 14/14/ 14/14                  Slit Lamp and Fundus Exam       External Exam         Right Left    External open lesion (covered by bandage) over right temple.  Moderate erythema and swelling extending from temple on to upper lid and slightly over lower lid.  No protpsis.  Can open eye spontaneously Normal              Slit Lamp Exam         Right Left    Lids/Lashes upper lid diffuse edema and erythema;not tender to touch Normal    Conjunctiva/Sclera minimal lateral injection;no chemosis White and quiet    Cornea Clear Clear    Anterior Chamber Deep and quiet Deep and quiet    Iris Round and reactive Round and reactive    Lens Clear Clear    Anterior Vitreous Normal Normal              Fundus Exam         Right Left    Disc Normal Normal    C/D Ratio 0.3 0.3    Macula Normal Normal    Vessels Normal Normal    Periphery Normal Normal                       CT Max/Face  11/4/22  FINDINGS:  There is right facial soft tissue swelling.  There is no definite large drainable fluid collection on noncontrast CT.  Prominent right cervical lymph nodes are likely reactive.     There are a few scattered dental caries without periapical lucencies.  There is no acute fracture identified.  There is a left maxillary sinus mucosal retention cyst.  The paranasal sinuses and mastoid air cells are otherwise clear.     The orbits are unremarkable.     Impression:     Right facial soft tissue swelling.  No large drainable fluid collection identified.     =======================================    Assessment/Plan:     1. Facial Cellulitis, Right temple  2. Periorbital Cellulitis, OD  - lid swelling likely spillover edema and not a true infection of upper lid skin.  There is no  evidence of orbital cellulitis.   - per photo comparison he has improved by the time of ophthalmology exam after IV-ABX.  - Would recommend current course of treatment given improvement and superficial location without drainable focus.  - Dilated exam unremarkable; vision great,  no diplopia, no orbital signs, color plates full, no apd, motility full, no chemosis. Only mild/moderate lid swelling.    Please re-consult if lid edema worsens or if concerned for orbital cellulitis; will continue to track peripherally through chart.     If there are further questions, please page the on call ophthalmology resident.    Flo Cancino MD  PGY3, Ophthalmology Resident  11/04/2022  5:28 PM

## 2022-11-04 NOTE — PROGRESS NOTES
Pharmacokinetic Initial Assessment: IV Vancomycin    Assessment/Plan:    Initiate intravenous vancomycin with loading dose of 2000 mg once followed by a maintenance dose of vancomycin 1000mg IV every  8 hours  Desired empiric serum trough concentration is 10 to 20 mcg/mL  Draw vancomycin trough level 90 min prior to fourth dose on 11/5 at approximately 1030  Pharmacy will continue to follow and monitor vancomycin.      Please contact pharmacy at extension [phone] with any questions regarding this assessment.     Thank you for the consult,   Pérezsara Jha       Patient brief summary:  Jude Lejeune is a 42 y.o. male initiated on antimicrobial therapy with IV Vancomycin for treatment of suspected skin & soft tissue infection    Drug Allergies:   Review of patient's allergies indicates:  No Known Allergies    Actual Body Weight:   96.1 kg    Renal Function:   Estimated Creatinine Clearance: 152.2 mL/min (based on SCr of 0.76 mg/dL).,     Dialysis Method (if applicable):  N/A    CBC (last 72 hours):  Recent Labs   Lab Result Units 11/03/22 2251 11/04/22  0534   WBC x10(3)/mcL 12.6* 9.2   Hgb gm/dL 14.9 13.8*   Hemoglobin A1c %  --  5.2   Hct % 43.2 40.4*   Platelet x10(3)/mcL 244 233   Mono % % 8.9 11.0   Eos % % 1.0 3.1   Basophil % % 0.5 0.5       Metabolic Panel (last 72 hours):  Recent Labs   Lab Result Units 11/03/22 2251 11/04/22  0534   Sodium Level mmol/L 139 140   Potassium Level mmol/L 3.7 3.8   Chloride mmol/L 101 101   Carbon Dioxide mmol/L 26 27   Glucose Level mg/dL 109* 113*   Blood Urea Nitrogen mg/dL 15.0 15.9   Creatinine mg/dL 0.86 0.76   Albumin Level gm/dL 3.8 3.6   Bilirubin Total mg/dL 0.4 0.4   Alkaline Phosphatase unit/L 101 96   Aspartate Aminotransferase unit/L 17 19   Alanine Aminotransferase unit/L 18 23   Magnesium Level mg/dL  --  2.40   Phosphorus Level mg/dL  --  3.1       Drug levels (last 3 results):  No results for input(s): VANCOMYCINRA, VANCORANDOM, VANCOMYCINPE, VANCOPEAK,  VANCOMYCINTR, VANCOTROUGH in the last 72 hours.    Microbiologic Results:  Microbiology Results (last 7 days)       Procedure Component Value Units Date/Time    Wound Culture [[id number]] Collected: 11/04/22 1023    Order Status: Sent Specimen: Abscess from Head Updated: 11/04/22 1036

## 2022-11-05 LAB
ALBUMIN SERPL-MCNC: 3.3 GM/DL (ref 3.5–5)
ALBUMIN/GLOB SERPL: 1.2 RATIO (ref 1.1–2)
ALP SERPL-CCNC: 84 UNIT/L (ref 40–150)
ALT SERPL-CCNC: 18 UNIT/L (ref 0–55)
AST SERPL-CCNC: 16 UNIT/L (ref 5–34)
BASOPHILS # BLD AUTO: 0.04 X10(3)/MCL (ref 0–0.2)
BASOPHILS NFR BLD AUTO: 0.7 %
BILIRUBIN DIRECT+TOT PNL SERPL-MCNC: 0.4 MG/DL
BUN SERPL-MCNC: 11.5 MG/DL (ref 8.9–20.6)
CALCIUM SERPL-MCNC: 9.3 MG/DL (ref 8.4–10.2)
CHLORIDE SERPL-SCNC: 104 MMOL/L (ref 98–107)
CHOLEST SERPL-MCNC: 189 MG/DL
CHOLEST/HDLC SERPL: 5 {RATIO} (ref 0–5)
CO2 SERPL-SCNC: 29 MMOL/L (ref 22–29)
CREAT SERPL-MCNC: 0.7 MG/DL (ref 0.73–1.18)
EOSINOPHIL # BLD AUTO: 0.28 X10(3)/MCL (ref 0–0.9)
EOSINOPHIL NFR BLD AUTO: 4.7 %
ERYTHROCYTE [DISTWIDTH] IN BLOOD BY AUTOMATED COUNT: 11.5 % (ref 11.5–17)
GFR SERPLBLD CREATININE-BSD FMLA CKD-EPI: >60 MLS/MIN/1.73/M2
GLOBULIN SER-MCNC: 2.8 GM/DL (ref 2.4–3.5)
GLUCOSE SERPL-MCNC: 103 MG/DL (ref 74–100)
HCT VFR BLD AUTO: 38.5 % (ref 42–52)
HDLC SERPL-MCNC: 35 MG/DL (ref 35–60)
HGB BLD-MCNC: 13.3 GM/DL (ref 14–18)
IMM GRANULOCYTES # BLD AUTO: 0.04 X10(3)/MCL (ref 0–0.04)
IMM GRANULOCYTES NFR BLD AUTO: 0.7 %
LDLC SERPL CALC-MCNC: 140 MG/DL (ref 50–140)
LYMPHOCYTES # BLD AUTO: 1.58 X10(3)/MCL (ref 0.6–4.6)
LYMPHOCYTES NFR BLD AUTO: 26.5 %
MAGNESIUM SERPL-MCNC: 2 MG/DL (ref 1.6–2.6)
MCH RBC QN AUTO: 31.4 PG (ref 27–31)
MCHC RBC AUTO-ENTMCNC: 34.5 MG/DL (ref 33–36)
MCV RBC AUTO: 90.8 FL (ref 80–94)
MONOCYTES # BLD AUTO: 0.55 X10(3)/MCL (ref 0.1–1.3)
MONOCYTES NFR BLD AUTO: 9.2 %
NEUTROPHILS # BLD AUTO: 3.5 X10(3)/MCL (ref 2.1–9.2)
NEUTROPHILS NFR BLD AUTO: 58.2 %
NRBC BLD AUTO-RTO: 0 %
PHOSPHATE SERPL-MCNC: 3.2 MG/DL (ref 2.3–4.7)
PLATELET # BLD AUTO: 234 X10(3)/MCL (ref 130–400)
PMV BLD AUTO: 8.8 FL (ref 7.4–10.4)
POTASSIUM SERPL-SCNC: 4.2 MMOL/L (ref 3.5–5.1)
PROT SERPL-MCNC: 6.1 GM/DL (ref 6.4–8.3)
RBC # BLD AUTO: 4.24 X10(6)/MCL (ref 4.7–6.1)
SODIUM SERPL-SCNC: 142 MMOL/L (ref 136–145)
TRIGL SERPL-MCNC: 70 MG/DL (ref 34–140)
VANCOMYCIN TROUGH SERPL-MCNC: 13 UG/ML (ref 15–20)
VLDLC SERPL CALC-MCNC: 14 MG/DL
WBC # SPEC AUTO: 6 X10(3)/MCL (ref 4.5–11.5)

## 2022-11-05 PROCEDURE — 25000003 PHARM REV CODE 250

## 2022-11-05 PROCEDURE — 80202 ASSAY OF VANCOMYCIN: CPT | Performed by: INTERNAL MEDICINE

## 2022-11-05 PROCEDURE — 63600175 PHARM REV CODE 636 W HCPCS: Performed by: INTERNAL MEDICINE

## 2022-11-05 PROCEDURE — 84100 ASSAY OF PHOSPHORUS: CPT

## 2022-11-05 PROCEDURE — 80061 LIPID PANEL: CPT

## 2022-11-05 PROCEDURE — 80053 COMPREHEN METABOLIC PANEL: CPT

## 2022-11-05 PROCEDURE — 27000207 HC ISOLATION

## 2022-11-05 PROCEDURE — 11000001 HC ACUTE MED/SURG PRIVATE ROOM

## 2022-11-05 PROCEDURE — 25000003 PHARM REV CODE 250: Performed by: INTERNAL MEDICINE

## 2022-11-05 PROCEDURE — 36415 COLL VENOUS BLD VENIPUNCTURE: CPT | Performed by: INTERNAL MEDICINE

## 2022-11-05 PROCEDURE — 85025 COMPLETE CBC W/AUTO DIFF WBC: CPT

## 2022-11-05 PROCEDURE — 83735 ASSAY OF MAGNESIUM: CPT

## 2022-11-05 PROCEDURE — 36415 COLL VENOUS BLD VENIPUNCTURE: CPT

## 2022-11-05 RX ADMIN — VANCOMYCIN HYDROCHLORIDE 1000 MG: 1 INJECTION, POWDER, LYOPHILIZED, FOR SOLUTION INTRAVENOUS at 12:11

## 2022-11-05 RX ADMIN — VANCOMYCIN HYDROCHLORIDE 1000 MG: 1 INJECTION, POWDER, LYOPHILIZED, FOR SOLUTION INTRAVENOUS at 04:11

## 2022-11-05 RX ADMIN — MUPIROCIN: 20 OINTMENT TOPICAL at 09:11

## 2022-11-05 RX ADMIN — VANCOMYCIN HYDROCHLORIDE 1000 MG: 1 INJECTION, POWDER, LYOPHILIZED, FOR SOLUTION INTRAVENOUS at 08:11

## 2022-11-05 RX ADMIN — CLINDAMYCIN PHOSPHATE 600 MG: 600 INJECTION, SOLUTION INTRAVENOUS at 05:11

## 2022-11-05 RX ADMIN — CLINDAMYCIN PHOSPHATE 600 MG: 600 INJECTION, SOLUTION INTRAVENOUS at 12:11

## 2022-11-05 RX ADMIN — CLINDAMYCIN PHOSPHATE 600 MG: 600 INJECTION, SOLUTION INTRAVENOUS at 08:11

## 2022-11-05 NOTE — PROGRESS NOTES
Pharmacokinetic Assessment Follow Up: IV Vancomycin    Vancomycin serum concentration assessment(s):    The trough level was drawn correctly and can be used to guide therapy at this time. The measurement is within the desired definitive target range of 10 to 15 mcg/mL.    Vancomycin Regimen Plan:    Continue current regimen of 1000 mg every 8 hours for now. Next serum concentration is scheduled for 11/6 at 1100.    Drug levels (last 3 results):  Recent Labs   Lab Result Units 11/05/22  1021   Vancomycin Trough ug/ml 13.0*       Pharmacy will continue to follow and monitor vancomycin.    Please contact pharmacy at extension [phone] for questions regarding this assessment.    Thank you for the consult,   Pérez Jha       Patient brief summary:  Jude Lejeune is a 42 y.o. male initiated on antimicrobial therapy with IV Vancomycin for treatment of skin & soft tissue infection    The patient's current regimen is 1000 mg every 8 hours    Drug Allergies:   Review of patient's allergies indicates:  No Known Allergies    Actual Body Weight:   96.1 kg    Renal Function:   Estimated Creatinine Clearance: 165.3 mL/min (A) (based on SCr of 0.7 mg/dL (L)).,     Dialysis Method (if applicable):  N/A    CBC (last 72 hours):  Recent Labs   Lab Result Units 11/03/22 2251 11/04/22  0534 11/05/22  0413   WBC x10(3)/mcL 12.6* 9.2 6.0   Hgb gm/dL 14.9 13.8* 13.3*   Hemoglobin A1c %  --  5.2  --    Hct % 43.2 40.4* 38.5*   Platelet x10(3)/mcL 244 233 234   Mono % % 8.9 11.0 9.2   Eos % % 1.0 3.1 4.7   Basophil % % 0.5 0.5 0.7       Metabolic Panel (last 72 hours):  Recent Labs   Lab Result Units 11/03/22 2251 11/04/22  0534 11/05/22  0413   Sodium Level mmol/L 139 140 142   Potassium Level mmol/L 3.7 3.8 4.2   Chloride mmol/L 101 101 104   Carbon Dioxide mmol/L 26 27 29   Glucose Level mg/dL 109* 113* 103*   Blood Urea Nitrogen mg/dL 15.0 15.9 11.5   Creatinine mg/dL 0.86 0.76 0.70*   Albumin Level gm/dL 3.8 3.6 3.3*   Bilirubin Total  mg/dL 0.4 0.4 0.4   Alkaline Phosphatase unit/L 101 96 84   Aspartate Aminotransferase unit/L 17 19 16   Alanine Aminotransferase unit/L 18 23 18   Magnesium Level mg/dL  --  2.40 2.00   Phosphorus Level mg/dL  --  3.1 3.2       Vancomycin Administrations:  vancomycin given in the last 96 hours                     vancomycin (VANCOCIN) 1,000 mg in sodium chloride 0.9% 250 mL IVPB (mg) 1,000 mg New Bag 11/05/22 0430     1,000 mg New Bag 11/04/22 2059    vancomycin (VANCOCIN) 2,000 mg in sodium chloride 0.9% 500 mL IVPB (mg) 2,000 mg New Bag 11/04/22 1029                    Microbiologic Results:  Microbiology Results (last 7 days)       Procedure Component Value Units Date/Time    Wound Culture [[id number]]  (Abnormal) Collected: 11/04/22 1023    Order Status: Completed Specimen: Abscess from Head Updated: 11/05/22 1001     Wound Culture Many Staphylococcus aureus

## 2022-11-05 NOTE — PROGRESS NOTES
[facility] Medicine Wards Progress Note     Resident Team: [facility] Medicine List 3  Attending Physician: Ezequiel Barahona MD    Date of Admit: 11/4/2022  Current Hospital Day: 2     Subjective:      Brief HPI:  Jude Lejeune is a 42 y.o. male who has  has no past medical history on file. that was admitted for periorbital cellulitis. He was transferred from [facility] to [facility] on 11/4/2022  with a primary complaint of redness and swelling to right eye x2 days. States about a week ago he had a boil behind right ear, he popped it draining purulent discharge and it has slowly worsened. Now he has a second boil to right scalp and erythema and swelling along scalp and to eyelid. No visual changes, no eye pain, no pain with extraocular movements, no photophobia. No fever, HA,N/V/D, cough, congestion, rhinorrhea or other sx. Denies recent travel, known sick contacts, insect/animal bites, hiking. Pt states he smokes about 0.5 PPD x 8 years. Drinks alcohol less than twice a month. No illicit substance use. Reports prior hx of MRSA to [facility].       At outside facility Had CT maxillofacial without contrast yesterday Moderate right facial soft tissue swelling . Sed rate 18, normal lactate.          Interval History: NAEO. Cellulitis improving with iv abx. States he is doing a lot better.      Review of Systems:  12 point ROS completed and negative except as indicated above.     Objective:     Vital Signs:  Vital Signs (Most Recent):  Temp: 98.3 °F (36.8 °C) (11/04/22 2342)  Pulse: 83 (11/05/22 0426)  Resp: 18 (11/04/22 1914)  BP: (!) 107/47 (11/05/22 0426)  SpO2: 99 % (11/05/22 0426)   Vital Signs (24h Range):  Temp:  [97.9 °F (36.6 °C)-98.4 °F (36.9 °C)] 98.3 °F (36.8 °C)  Pulse:  [74-95] 83  Resp:  [18-20] 18  SpO2:  [97 %-99 %] 99 %  BP: (107-146)/(47-87) 107/47   Body mass index is 28.74 kg/m².     Intake/Output Summary (Last 24 hours) at 11/5/2022 0500  Last data filed at 11/4/2022 1635  Gross per 24 hour   Intake 100 ml   Output --    Net 100 ml       Physical Examination:  Vital signs and nursing notes reviewed.  Constitutional: Patient is in NAD. Awake and alert.   HEENT: Atraumatic. Normocephalic. Conjunctivae nl. Mild erythema and swelling from right temple to right eyelids. Bandages in place. No proptosis. No chemosis. Non-tender.   ENT: Mucous membranes are moist. Oropharynx is clear.  Neck: Supple. Full ROM. No lymphadenopathy.  Cardiovascular: Regular rate and rhythm. No murmurs, rubs, or gallops. Distal pulses are 2+ and symmetric.  Pulmonary/Chest: No respiratory distress. Clear to auscultation bilaterally. No wheezing, rales, or rhonchi.  Abdominal: Soft. Non-distended. No TTP. No rebound, guarding, or rigidity.   Musculoskeletal: Moves all extremities. No edema.    Skin: Warm and dry.  Neurological: Awake and alert. No acute focal neurological deficits are appreciated.        Laboratory:    Recent Labs   Lab 11/05/22  0413   WBC 6.0   HGB 13.3*   HCT 38.5*      MCV 90.8   RDW 11.5     No results for input(s): TROPONINI, CKTOTAL, CKMB, BNP in the last 24 hours.  No results for input(s): TROPONINI, CKTOTAL, CKMB, BNP in the last 168 hours.  No results for input(s): CHOL, HDL, LDLCALC, TRIG, CHOLHDL in the last 168 hours. Recent Labs   Lab 11/04/22  0534      K 3.8   CO2 27   BUN 15.9   CREATININE 0.76   CALCIUM 9.1   MG 2.40   PHOS 3.1     Recent Labs   Lab 11/04/22  0534   ALBUMIN 3.6   BILITOT 0.4   AST 19   ALKPHOS 96   ALT 23     No results for input(s): IRON, TIBC, FERRITIN, SATURATEDIRO, [id number], FOLATE in the last 168 hours.  Recent Labs   Lab 11/04/22  0534   TSH 1.9412   HGBA1C 5.2          Microbiology Data:  Microbiology Results (last 7 days)       Procedure Component Value Units Date/Time    Wound Culture [[id number]] Collected: 11/04/22 1023    Order Status: Sent Specimen: Abscess from Head Updated: 11/04/22 1039               Radiology:     Current Medications:     Infusions:        Scheduled:    clindamycin (CLEOCIN) IVPB  600 mg Intravenous Q8H    mupirocin   Topical (Top) Daily    vancomycin (VANCOCIN) IVPB  1,000 mg Intravenous Q8H         PRN:   dextrose 10%    dextrose 10%    glucagon (human recombinant)    glucose    glucose    naloxone    sodium chloride 0.9%    vancomycin - pharmacy to dose        Antibiotics and Day Number of Therapy:  Antibiotics (From admission, onward)      Start     Stop Route Frequency Ordered    11/04/22 2000  vancomycin (VANCOCIN) 1,000 mg in sodium chloride 0.9% 250 mL IVPB         -- IV Every 8 hours (non-standard times) 11/04/22 1130    11/04/22 1044  vancomycin - pharmacy to dose  (vancomycin IVPB)        [facility].    -- IV pharmacy to manage frequency 11/04/22 0945    11/04/22 0900  mupirocin 2 % ointment         -- Top Daily 11/04/22 0724    11/04/22 0600  clindamycin in D5W 600 mg/50 mL IVPB 600 mg         -- IV Every 8 hours (non-standard times) 11/04/22 0452                 Assessment & Plan:       Pre-septal Cellulitis, hx of MRSA  facial cellulitis  Leukocytosis, left shift - resolved  SIRS 2/4 on presentation to outside facility  -continue clinda 600mg q8hr, rocephin 1g q24  - significant improvement from prior exam.  - hiv, syphilis, hepatitis, MRSA screening negative.  -CT at outside facility: Moderate right facial soft tissue swelling, no drainable collection.   -Blood cultures drawn at outside facility, pending.   - sed rate 18,  - normal lactate.  - consult to wound care  - low suspicion for shingles, nonvessicluar lesion.   - optho consulted, appreciate recs      mild normocytic anemia  Outpatient follow up     CODE STATUS: full  Access: piv  Antibiotics: clinda, rocephin  Diet: regular  DVT Prophylaxis: SCD  GI Prophylaxis: none  Fluids: 75cc    Disposition: stable, improving. Awaiting blood cultures, likely able to discharge home with appropriate abx.         Kin Cueva DO  Internal Medicine Resident  PGY-1        Attending addendum to follow.

## 2022-11-06 VITALS
WEIGHT: 211.88 LBS | OXYGEN SATURATION: 97 % | HEART RATE: 77 BPM | RESPIRATION RATE: 20 BRPM | SYSTOLIC BLOOD PRESSURE: 130 MMHG | BODY MASS INDEX: 28.7 KG/M2 | TEMPERATURE: 97 F | DIASTOLIC BLOOD PRESSURE: 80 MMHG | HEIGHT: 72 IN

## 2022-11-06 LAB
ALBUMIN SERPL-MCNC: 3.3 GM/DL (ref 3.5–5)
ALBUMIN/GLOB SERPL: 1 RATIO (ref 1.1–2)
ALP SERPL-CCNC: 78 UNIT/L (ref 40–150)
ALT SERPL-CCNC: 18 UNIT/L (ref 0–55)
AST SERPL-CCNC: 15 UNIT/L (ref 5–34)
BACTERIA SPEC CULT: ABNORMAL
BASOPHILS # BLD AUTO: 0.04 X10(3)/MCL (ref 0–0.2)
BASOPHILS NFR BLD AUTO: 0.6 %
BILIRUBIN DIRECT+TOT PNL SERPL-MCNC: 0.3 MG/DL
BUN SERPL-MCNC: 10.1 MG/DL (ref 8.9–20.6)
CALCIUM SERPL-MCNC: 9.8 MG/DL (ref 8.4–10.2)
CHLORIDE SERPL-SCNC: 102 MMOL/L (ref 98–107)
CO2 SERPL-SCNC: 31 MMOL/L (ref 22–29)
CREAT SERPL-MCNC: 0.78 MG/DL (ref 0.73–1.18)
EOSINOPHIL # BLD AUTO: 0.24 X10(3)/MCL (ref 0–0.9)
EOSINOPHIL NFR BLD AUTO: 3.8 %
ERYTHROCYTE [DISTWIDTH] IN BLOOD BY AUTOMATED COUNT: 11.3 % (ref 11.5–17)
GFR SERPLBLD CREATININE-BSD FMLA CKD-EPI: >60 MLS/MIN/1.73/M2
GLOBULIN SER-MCNC: 3.4 GM/DL (ref 2.4–3.5)
GLUCOSE SERPL-MCNC: 107 MG/DL (ref 74–100)
HCT VFR BLD AUTO: 39.2 % (ref 42–52)
HGB BLD-MCNC: 13.5 GM/DL (ref 14–18)
IMM GRANULOCYTES # BLD AUTO: 0.04 X10(3)/MCL (ref 0–0.04)
IMM GRANULOCYTES NFR BLD AUTO: 0.6 %
LYMPHOCYTES # BLD AUTO: 1.66 X10(3)/MCL (ref 0.6–4.6)
LYMPHOCYTES NFR BLD AUTO: 26 %
MAGNESIUM SERPL-MCNC: 2 MG/DL (ref 1.6–2.6)
MCH RBC QN AUTO: 30.9 PG (ref 27–31)
MCHC RBC AUTO-ENTMCNC: 34.4 MG/DL (ref 33–36)
MCV RBC AUTO: 89.7 FL (ref 80–94)
MONOCYTES # BLD AUTO: 0.59 X10(3)/MCL (ref 0.1–1.3)
MONOCYTES NFR BLD AUTO: 9.2 %
NEUTROPHILS # BLD AUTO: 3.8 X10(3)/MCL (ref 2.1–9.2)
NEUTROPHILS NFR BLD AUTO: 59.8 %
NRBC BLD AUTO-RTO: 0 %
PHOSPHATE SERPL-MCNC: 3.4 MG/DL (ref 2.3–4.7)
PLATELET # BLD AUTO: 243 X10(3)/MCL (ref 130–400)
PMV BLD AUTO: 8.8 FL (ref 7.4–10.4)
POTASSIUM SERPL-SCNC: 4.1 MMOL/L (ref 3.5–5.1)
PROT SERPL-MCNC: 6.7 GM/DL (ref 6.4–8.3)
RBC # BLD AUTO: 4.37 X10(6)/MCL (ref 4.7–6.1)
SODIUM SERPL-SCNC: 140 MMOL/L (ref 136–145)
VANCOMYCIN TROUGH SERPL-MCNC: 11.8 UG/ML (ref 15–20)
WBC # SPEC AUTO: 6.4 X10(3)/MCL (ref 4.5–11.5)

## 2022-11-06 PROCEDURE — 80202 ASSAY OF VANCOMYCIN: CPT | Performed by: INTERNAL MEDICINE

## 2022-11-06 PROCEDURE — 36415 COLL VENOUS BLD VENIPUNCTURE: CPT

## 2022-11-06 PROCEDURE — 85025 COMPLETE CBC W/AUTO DIFF WBC: CPT

## 2022-11-06 PROCEDURE — 63600175 PHARM REV CODE 636 W HCPCS: Performed by: INTERNAL MEDICINE

## 2022-11-06 PROCEDURE — 83735 ASSAY OF MAGNESIUM: CPT

## 2022-11-06 PROCEDURE — 25000003 PHARM REV CODE 250: Performed by: INTERNAL MEDICINE

## 2022-11-06 PROCEDURE — 36415 COLL VENOUS BLD VENIPUNCTURE: CPT | Performed by: INTERNAL MEDICINE

## 2022-11-06 PROCEDURE — 25000003 PHARM REV CODE 250

## 2022-11-06 PROCEDURE — 80053 COMPREHEN METABOLIC PANEL: CPT

## 2022-11-06 PROCEDURE — 84100 ASSAY OF PHOSPHORUS: CPT

## 2022-11-06 RX ORDER — SULFAMETHOXAZOLE AND TRIMETHOPRIM 800; 160 MG/1; MG/1
1 TABLET ORAL 2 TIMES DAILY
Qty: 14 TABLET | Refills: 0 | Status: SHIPPED | OUTPATIENT
Start: 2022-11-06 | End: 2022-11-06 | Stop reason: SDUPTHER

## 2022-11-06 RX ORDER — SULFAMETHOXAZOLE AND TRIMETHOPRIM 800; 160 MG/1; MG/1
1 TABLET ORAL 2 TIMES DAILY
Qty: 14 TABLET | Refills: 0 | Status: SHIPPED | OUTPATIENT
Start: 2022-11-06 | End: 2022-11-13

## 2022-11-06 RX ADMIN — VANCOMYCIN HYDROCHLORIDE 1000 MG: 1 INJECTION, POWDER, LYOPHILIZED, FOR SOLUTION INTRAVENOUS at 03:11

## 2022-11-06 RX ADMIN — CLINDAMYCIN PHOSPHATE 600 MG: 600 INJECTION, SOLUTION INTRAVENOUS at 04:11

## 2022-11-06 RX ADMIN — MUPIROCIN: 20 OINTMENT TOPICAL at 09:11

## 2022-11-06 NOTE — DISCHARGE INSTRUCTIONS
Discharge instructions given. Patient aware to  antibiotic from local pharmacy. Denies any questions or concerns. No acute distress noted.

## 2022-11-06 NOTE — DISCHARGE SUMMARY
[facility] Internal Medicine Discharge Summary    Admitting Physician: Ezequiel Barahona MD  Attending Physician: Ezequiel Barahona MD  Date of Admit: 11/4/2022  Date of Discharge: 11/6/2022    Condition: Good  Outcome: Condition has improved and patient is now ready for discharge.  DISPOSITION: Home or Self Care          Discharge Diagnoses     Patient Active Problem List   Diagnosis    Preseptal cellulitis of right eye    Cellulitis of postauricular region       Principal Problem:  Preseptal cellulitis of right eye    Consultants and Procedures     Consultants:  WOUND CARE CONSULT  IP CONSULT TO OPHTHALMOLOGY  PHARMACY TO DOSE VANCOMYCIN CONSULT    Procedures:   * No surgery found *     Brief Admission History      Jude Lejeune is a 42 y.o. male who has  has no past medical history on file. that was admitted for periorbital cellulitis. He was transferred from [facility] to [facility] on 11/4/2022  with a primary complaint of redness and swelling to right eye x2 days. States about a week ago he had a boil behind right ear, he popped it draining purulent discharge and it has slowly worsened. Now he has a second boil to right scalp and erythema and swelling along scalp and to eyelid. No visual changes, no eye pain, no pain with extraocular movements, no photophobia. No fever, HA,N/V/D, cough, congestion, rhinorrhea or other sx. Denies recent travel, known sick contacts, insect/animal bites, hiking. Pt states he smokes about 0.5 PPD x 8 years. Drinks alcohol less than twice a month. No illicit substance use. Reports prior hx of MRSA to [facility].       At outside facility Had CT maxillofacial without contrast yesterday Moderate right facial soft tissue swelling . Sed rate 18, normal lactate.        Hospital Course with Pertinent Findings     Pt with periorbital and facial cellulitis was admitted for IV abx. Optho consulted and agreed with assessment and plan. Condition improved significantly with wound care and IV abx. MRSA PCR and  wound culture positive for MRSA sensitive to bactrim. Blood cultures negative at 48 hours. Pt had leukocytosis on admit but has resolved. Pt states he is feeling better and is ready to go home.     Discharge physical exam:  Vitals  BP: 130/80  Temp: 97.4 °F (36.3 °C)  Temp src: Oral  Pulse: 77  Resp: 18  SpO2: 97 %  Height: 6' (182.9 cm)  Weight: 96.1 kg (211 lb 14.4 oz)    Vital signs and nursing notes reviewed.  Constitutional: Patient is in NAD. Awake and alert.    HEENT:  Conjunctivae nl. No scleral icterus. No proptosis. No pain with eye movement. No changes in vision. Eyelid edema and erythema is only trace, significantly improved from prior. Bandages in place.  Mucous membranes are moist.     Neck: Supple. Full ROM.   Cardiovascular: Regular rate and rhythm. No murmurs, rubs, or gallops. Distal pulses are 2+ and symmetric .  Pulmonary/Chest: No respiratory distress. Clear to auscultation bilaterally. No wheezing, rales, or rhonchi.  Abdominal: Soft. Non-distended. No TTP. No rebound, guarding, or rigidity.   Musculoskeletal: Moves all extremities. No edema.    Skin: Warm and dry.  Neurological: Awake and alert. No acute focal neurological deficits are appreciated.      TIME SPENT ON DISCHARGE: 60 minutes    Discharge Medications        Medication List        START taking these medications      sulfamethoxazole-trimethoprim 800-160mg 800-160 mg Tab  Commonly known as: BACTRIM DS  Take 1 tablet by mouth 2 (two) times daily. for 7 days               Where to Get Your Medications        These medications were sent to [id number] - [place] - [address] & [facility]  [address] [address]      Phone: [phone]   sulfamethoxazole-trimethoprim 800-160mg 800-160 mg Tab         Discharge Information:     The case has been discussed with staff/attending physician. The patient has been seen and evaluated during rounds where the plan to discharge was discussed with pt. 7  days outpatient bactrim DS. F/u with PCP in 1-2 weeks and wound care appt 11/10/22. Follow the wound care recommendations. RTER if any new/worsening sx. Pt/family express understanding and agree with the plan.       Kin Cueva DO  [facility] Internal Medicine, HO-1

## 2022-11-06 NOTE — PLAN OF CARE
Problem: Adult Inpatient Plan of Care  Goal: Plan of Care Review  11/6/2022 1104 by Racquel Vasquez RN  Outcome: Met  11/6/2022 0929 by Racquel Vasquez RN  Outcome: Ongoing, Progressing  Goal: Patient-Specific Goal (Individualized)  11/6/2022 1104 by Racquel Vasquez RN  Outcome: Met  11/6/2022 0929 by Racquel Vasquez RN  Outcome: Ongoing, Progressing  Goal: Absence of Hospital-Acquired Illness or Injury  11/6/2022 1104 by Racquel Vasquez RN  Outcome: Met  11/6/2022 0929 by Racquel Vasquez RN  Outcome: Ongoing, Progressing  Goal: Optimal Comfort and Wellbeing  11/6/2022 1104 by Racquel Vasquez RN  Outcome: Met  11/6/2022 0929 by Racquel Vasquez RN  Outcome: Ongoing, Progressing  Goal: Readiness for Transition of Care  11/6/2022 1104 by Racquel Vasquez RN  Outcome: Met  11/6/2022 0929 by Racquel Vasquez RN  Outcome: Ongoing, Progressing     Problem: Pain Acute  Goal: Acceptable Pain Control and Functional Ability  11/6/2022 1104 by Racquel Vasquez RN  Outcome: Met  11/6/2022 0929 by Racquel Vasquez RN  Outcome: Ongoing, Progressing     Problem: Infection  Goal: Absence of Infection Signs and Symptoms  11/6/2022 1104 by Racquel Vasquez RN  Outcome: Met  11/6/2022 0929 by Racquel Vasquez RN  Outcome: Ongoing, Progressing

## 2022-11-06 NOTE — PROGRESS NOTES
[facility] Medicine Wards Progress Note     Resident Team: [facility] Medicine List 3  Attending Physician: Ezequiel Barahona MD    Date of Admit: 11/4/2022  Current Hospital Day: 3     Subjective:      Brief HPI:  Jude Lejeune is a 42 y.o. male who has  has no past medical history on file. that was admitted for periorbital cellulitis. He was transferred from [facility] to [facility] on 11/4/2022  with a primary complaint of redness and swelling to right eye x2 days. States about a week ago he had a boil behind right ear, he popped it draining purulent discharge and it has slowly worsened. Now he has a second boil to right scalp and erythema and swelling along scalp and to eyelid. No visual changes, no eye pain, no pain with extraocular movements, no photophobia. No fever, HA,N/V/D, cough, congestion, rhinorrhea or other sx. Denies recent travel, known sick contacts, insect/animal bites, hiking. Pt states he smokes about 0.5 PPD x 8 years. Drinks alcohol less than twice a month. No illicit substance use. Reports prior hx of MRSA to [facility].       At outside facility Had CT maxillofacial without contrast yesterday Moderate right facial soft tissue swelling . Sed rate 18, normal lactate.          Interval History: NAEO. Cellulitis improving with iv abx. States he is doing a lot better.      Review of Systems:  12 point ROS completed and negative except as indicated above.     Objective:     Vital Signs:  Vital Signs (Most Recent):  Temp: 98.1 °F (36.7 °C) (11/06/22 0306)  Pulse: 82 (11/06/22 0306)  Resp: 18 (11/06/22 0306)  BP: 120/72 (11/06/22 0306)  SpO2: 97 % (11/06/22 0306)   Vital Signs (24h Range):  Temp:  [97.3 °F (36.3 °C)-98.6 °F (37 °C)] 98.1 °F (36.7 °C)  Pulse:  [72-89] 82  Resp:  [18-20] 18  SpO2:  [95 %-99 %] 97 %  BP: (111-132)/(72-83) 120/72   Body mass index is 28.74 kg/m².     Intake/Output Summary (Last 24 hours) at 11/6/2022 0639  Last data filed at 11/6/2022 0404  Gross per 24 hour   Intake 1970 ml   Output 601 ml    Net 1369 ml       Physical Examination:  Vital signs and nursing notes reviewed.  Constitutional: Patient is in NAD. Awake and alert. Sleeping comfortably.  HEENT: Atraumatic. Normocephalic. Conjunctivae nl. Mild erythema and swelling from right temple to right eyelids. Bandages in place. No proptosis. No chemosis. Non-tender.   ENT: Mucous membranes are moist. Oropharynx is clear.  Neck: Supple. Full ROM.   Cardiovascular: Regular rate and rhythm. No murmurs, rubs, or gallops. Distal pulses are 2+ and symmetric.  Pulmonary/Chest: No respiratory distress. Clear to auscultation bilaterally. No wheezing, rales, or rhonchi.  Abdominal: Soft. Non-distended.   Musculoskeletal: Moves all extremities. No edema.    Skin: Warm and dry.  Neurological: Awake and alert. No acute focal neurological deficits are appreciated.        Laboratory:    Recent Labs   Lab 11/06/22 0422   WBC 6.4   HGB 13.5*   HCT 39.2*      MCV 89.7   RDW 11.3*     No results for input(s): TROPONINI, CKTOTAL, CKMB, BNP in the last 24 hours.  No results for input(s): TROPONINI, CKTOTAL, CKMB, BNP in the last 168 hours.  Recent Labs   Lab 11/05/22  0413   CHOL 189   HDL 35   TRIG 70    Recent Labs   Lab 11/06/22  0422      K 4.1   CO2 31*   BUN 10.1   CREATININE 0.78   CALCIUM 9.8   MG 2.00   PHOS 3.4     Recent Labs   Lab 11/06/22 0422   ALBUMIN 3.3*   BILITOT 0.3   AST 15   ALKPHOS 78   ALT 18     No results for input(s): IRON, TIBC, FERRITIN, SATURATEDIRO, [id number], FOLATE in the last 168 hours.  Recent Labs   Lab 11/04/22  0534   TSH 1.9412   HGBA1C 5.2          Microbiology Data:  Microbiology Results (last 7 days)       Procedure Component Value Units Date/Time    Wound Culture [[id number]]  (Abnormal) Collected: 11/04/22 1023    Order Status: Completed Specimen: Abscess from Head Updated: 11/05/22 1001     Wound Culture Many Staphylococcus aureus               Radiology:     Current Medications:     Infusions:        Scheduled:    clindamycin (CLEOCIN) IVPB  600 mg Intravenous Q8H    mupirocin   Topical (Top) Daily    vancomycin (VANCOCIN) IVPB  1,000 mg Intravenous Q8H         PRN:   dextrose 10%    dextrose 10%    glucagon (human recombinant)    glucose    glucose    naloxone    sodium chloride 0.9%    vancomycin - pharmacy to dose        Antibiotics and Day Number of Therapy:  Antibiotics (From admission, onward)      Start     Stop Route Frequency Ordered    11/04/22 2000  vancomycin (VANCOCIN) 1,000 mg in sodium chloride 0.9% 250 mL IVPB         -- IV Every 8 hours (non-standard times) 11/04/22 1130    11/04/22 1044  vancomycin - pharmacy to dose  (vancomycin IVPB)        [facility] for [facility].    -- IV pharmacy to manage frequency 11/04/22 0945    11/04/22 0900  mupirocin 2 % ointment         -- Top Daily 11/04/22 0724    11/04/22 0600  clindamycin in D5W 600 mg/50 mL IVPB 600 mg         -- IV Every 8 hours (non-standard times) 11/04/22 0452                 Assessment & Plan:       Pre-septal Cellulitis, hx of MRSA  facial cellulitis  Leukocytosis, left shift - resolved  SIRS 2/4 on presentation to outside facility  -continue clinda 600mg q8hr, rocephin 1g q24  - improvement from prior exam.  - hiv, syphilis, hepatitis negative.  MRSA PCR positive.  -CT at outside facility: Moderate right facial soft tissue swelling, no drainable collection.   -Blood cultures negative at 24 hours.   - consult to wound care, wound cultures positive for MRSA.   - low suspicion for shingles, nonvessicluar lesion.   - optho consulted, appreciate recs      mild normocytic anemia  Outpatient follow up     CODE STATUS: full  Access: piv  Antibiotics: clinda, rocephin  Diet: regular  DVT Prophylaxis: SCD  GI Prophylaxis: none  Fluids: 75cc    Disposition: stable, improving. Awaiting blood cultures, likely able to discharge home with bactrim DS BID if blood cultures are negative at 48.           Kin Cueva DO  Internal Medicine Resident  PGY-1        Attending addendum to follow.

## 2022-11-07 LAB — PATH REV: NORMAL

## 2022-11-09 LAB
BACTERIA BLD CULT: NORMAL
BACTERIA BLD CULT: NORMAL

## 2025-03-07 ENCOUNTER — OFFICE VISIT (OUTPATIENT)
Dept: URGENT CARE | Facility: CLINIC | Age: 45
End: 2025-03-07
Payer: MEDICAID

## 2025-03-07 VITALS
SYSTOLIC BLOOD PRESSURE: 144 MMHG | HEIGHT: 72 IN | OXYGEN SATURATION: 99 % | BODY MASS INDEX: 28.7 KG/M2 | HEART RATE: 100 BPM | DIASTOLIC BLOOD PRESSURE: 99 MMHG | RESPIRATION RATE: 17 BRPM | WEIGHT: 211.88 LBS | TEMPERATURE: 98 F

## 2025-03-07 DIAGNOSIS — L08.9 LOCALIZED BACTERIAL SKIN INFECTION: Primary | ICD-10-CM

## 2025-03-07 DIAGNOSIS — Z86.14 HISTORY OF MRSA INFECTION: ICD-10-CM

## 2025-03-07 DIAGNOSIS — B96.89 LOCALIZED BACTERIAL SKIN INFECTION: Primary | ICD-10-CM

## 2025-03-07 RX ORDER — MUPIROCIN 20 MG/G
OINTMENT TOPICAL 3 TIMES DAILY
Qty: 22 G | Refills: 0 | Status: SHIPPED | OUTPATIENT
Start: 2025-03-07 | End: 2025-03-17

## 2025-03-07 RX ORDER — SULFAMETHOXAZOLE AND TRIMETHOPRIM 800; 160 MG/1; MG/1
1 TABLET ORAL 2 TIMES DAILY
Qty: 14 TABLET | Refills: 0 | Status: SHIPPED | OUTPATIENT
Start: 2025-03-07 | End: 2025-03-14

## 2025-03-07 RX ORDER — PREDNISONE 20 MG/1
20 TABLET ORAL 2 TIMES DAILY
Qty: 4 TABLET | Refills: 0 | Status: SHIPPED | OUTPATIENT
Start: 2025-03-07 | End: 2025-03-09

## 2025-03-07 NOTE — PATIENT INSTRUCTIONS
Assessment/Plan:   Localized bacterial skin infection  -     sulfamethoxazole-trimethoprim 800-160mg (BACTRIM DS) 800-160 mg Tab; Take 1 tablet by mouth 2 (two) times daily. for 7 days  Dispense: 14 tablet; Refill: 0  -     predniSONE (DELTASONE) 20 MG tablet; Take 1 tablet (20 mg total) by mouth 2 (two) times daily. for 2 days  Dispense: 4 tablet; Refill: 0  -     mupirocin (BACTROBAN) 2 % ointment; Apply topically 3 (three) times daily. for 10 days  Dispense: 22 g; Refill: 0    History of MRSA infection  Coverage as above.  Return to clinic/follow up with PCP should symptoms progress or worsen         Education and counseling done face to face regarding medical conditions and plan. Contact office if new symptoms develop. Should any symptoms ever significantly worsen seek emergency medical attention/go to ER.

## 2025-03-07 NOTE — PROGRESS NOTES
Patient ID: Jude Lejeune is a 44 y.o. male.  Chief Complaint: Recurrent Skin Infections    HPI:   Patient presents here today for above reason.        Patient is a 44 y.o. male who presents to urgent care with complaints of possible skin infection to the chin x 1 week onset intermittently. Describes s/s that resolve, then gradually rebound. Attributes onset to infected hair follicle coupled with possible allergy to unspecified shaving oil. Noting symptoms gradually rebounded this am after reapplication of shaving oil product.  Medicating with topical Bactroban w/unnoticeable resolve. Patient denies fever.    Past Medical History:  Past Medical History:   Diagnosis Date    Renal stones      Past Surgical History:   Procedure Laterality Date    KIDNEY STONE SURGERY      NO PAST SURGERIES       Review of patient's allergies indicates:  No Known Allergies  Current Outpatient Medications   Medication Instructions    mupirocin (BACTROBAN) 2 % ointment Topical (Top), 3 times daily    predniSONE (DELTASONE) 20 mg, Oral, 2 times daily    sulfamethoxazole-trimethoprim 800-160mg (BACTRIM DS) 800-160 mg Tab 1 tablet, Oral, 2 times daily     Social History[1]    ROS:   Review of Systems  12 point review of systems conducted, negative except as stated in the history of present illness. See HPI for details.   Vitals/PE:   Visit Vitals  BP (!) 144/99 (Patient Position: Sitting)   Pulse 100   Temp 98.3 °F (36.8 °C)   Resp 17   Ht 6' (1.829 m)   Wt 96.1 kg (211 lb 14.4 oz)   SpO2 99%   BMI 28.74 kg/m²     Physical Exam  Vitals and nursing note reviewed.   Constitutional:       General: He is not in acute distress.     Appearance: Normal appearance. He is not ill-appearing, toxic-appearing or diaphoretic.   HENT:      Head:        Right Ear: Tympanic membrane normal.      Left Ear: Tympanic membrane normal.      Mouth/Throat:      Mouth: Mucous membranes are dry.      Pharynx: Oropharynx is clear.   Eyes:      Extraocular Movements:  Extraocular movements intact.      Conjunctiva/sclera: Conjunctivae normal.      Pupils: Pupils are equal, round, and reactive to light.   Neck:      Vascular: No carotid bruit.   Cardiovascular:      Rate and Rhythm: Normal rate and regular rhythm.      Pulses: Normal pulses.      Heart sounds: Normal heart sounds. No murmur heard.     No friction rub. No gallop.   Pulmonary:      Effort: Pulmonary effort is normal. No respiratory distress.      Breath sounds: No stridor. No wheezing or rhonchi.   Abdominal:      General: There is no distension.      Palpations: There is no mass.      Tenderness: There is no abdominal tenderness. There is no left CVA tenderness, guarding or rebound.      Hernia: No hernia is present.   Musculoskeletal:         General: No swelling or signs of injury. Normal range of motion.      Cervical back: Normal range of motion and neck supple. No rigidity or tenderness.      Right lower leg: No edema.      Left lower leg: No edema.   Lymphadenopathy:      Cervical: No cervical adenopathy.   Skin:     Capillary Refill: Capillary refill takes less than 2 seconds.      Coloration: Skin is not jaundiced.      Findings: No bruising, lesion or rash.   Neurological:      General: No focal deficit present.      Mental Status: He is alert and oriented to person, place, and time. Mental status is at baseline.      Cranial Nerves: No cranial nerve deficit.      Sensory: No sensory deficit.      Motor: No weakness.      Coordination: Coordination normal.      Gait: Gait normal.   Psychiatric:         Mood and Affect: Mood normal.         Behavior: Behavior normal.         Thought Content: Thought content normal.         Judgment: Judgment normal.         Results for orders placed or performed during the hospital encounter of 11/04/22   TSH    Collection Time: 11/04/22  5:34 AM   Result Value Ref Range    TSH 1.9412 0.3500 - 4.9400 uIU/mL   Comprehensive Metabolic Panel (CMP)    Collection Time: 11/04/22   5:34 AM   Result Value Ref Range    Sodium 140 136 - 145 mmol/L    Potassium 3.8 3.5 - 5.1 mmol/L    Chloride 101 98 - 107 mmol/L    CO2 27 22 - 29 mmol/L    Glucose 113 (H) 74 - 100 mg/dL    Blood Urea Nitrogen 15.9 8.9 - 20.6 mg/dL    Creatinine 0.76 0.73 - 1.18 mg/dL    Calcium 9.1 8.4 - 10.2 mg/dL    Protein Total 6.5 6.4 - 8.3 gm/dL    Albumin 3.6 3.5 - 5.0 gm/dL    Globulin 2.9 2.4 - 3.5 gm/dL    Albumin/Globulin Ratio 1.2 1.1 - 2.0 ratio    Bilirubin Total 0.4 <=1.5 mg/dL    ALP 96 40 - 150 unit/L    ALT 23 0 - 55 unit/L    AST 19 5 - 34 unit/L    eGFR >60 mls/min/1.73/m2   Magnesium    Collection Time: 11/04/22  5:34 AM   Result Value Ref Range    Magnesium Level 2.40 1.60 - 2.60 mg/dL   Phosphorus    Collection Time: 11/04/22  5:34 AM   Result Value Ref Range    Phosphorus Level 3.1 2.3 - 4.7 mg/dL   T4, free    Collection Time: 11/04/22  5:34 AM   Result Value Ref Range    Thyroxine Free 0.93 0.70 - 1.48 ng/dL   Hemoglobin A1C    Collection Time: 11/04/22  5:34 AM   Result Value Ref Range    Hemoglobin A1c 5.2 <=7.0 %    Estimated Average Glucose 102.5 mg/dL   HIV 1/2 Ag/Ab (4th Gen)    Collection Time: 11/04/22  5:34 AM   Result Value Ref Range    HIV Nonreactive Nonreactive   SYPHILIS ANTIBODY (WITH REFLEX RPR)    Collection Time: 11/04/22  5:34 AM   Result Value Ref Range    Syphilis Antibody Nonreactive Nonreactive, Equivocal   Hepatitis Panel, Acute    Collection Time: 11/04/22  5:34 AM   Result Value Ref Range    Hep A IgM Interp Nonreactive Nonreactive    Hep B Core IgM Interp Nonreactive Nonreactive    Hep BsAg Interp Nonreactive Nonreactive    Hep C Ab Interp Nonreactive Nonreactive   CBC with Differential    Collection Time: 11/04/22  5:34 AM   Result Value Ref Range    WBC 9.2 4.5 - 11.5 x10(3)/mcL    RBC 4.44 (L) 4.70 - 6.10 x10(6)/mcL    Hgb 13.8 (L) 14.0 - 18.0 gm/dL    Hct 40.4 (L) 42.0 - 52.0 %    MCV 91.0 80.0 - 94.0 fL    MCH 31.1 (H) 27.0 - 31.0 pg    MCHC 34.2 33.0 - 36.0 mg/dL    RDW  11.7 11.5 - 17.0 %    Platelet 233 130 - 400 x10(3)/mcL    MPV 8.9 7.4 - 10.4 fL    Neut % 67.9 %    Lymph % 17.0 %    Mono % 11.0 %    Eos % 3.1 %    Basophil % 0.5 %    Lymph # 1.56 0.6 - 4.6 x10(3)/mcL    Neut # 6.2 2.1 - 9.2 x10(3)/mcL    Mono # 1.01 0.1 - 1.3 x10(3)/mcL    Eos # 0.28 0 - 0.9 x10(3)/mcL    Baso # 0.05 0 - 0.2 x10(3)/mcL    Imm Gran # 0.05 (H) 0 - 0.04 x10(3)/mcL    Imm Grans % 0.5 %    NRBC% 0.0 %   C-Reactive Protein    Collection Time: 11/04/22  5:34 AM   Result Value Ref Range    CRP 79.50 (H) <5.00 mg/L   Pathologist Interpretation    Collection Time: 11/04/22  5:34 AM   Result Value Ref Range    Pathology Review       No serological evidence of recent or past hepatitis A, B, or C infection.    Kenneth West M.D.      MRSA PCR    Collection Time: 11/04/22  5:52 AM   Result Value Ref Range    MRSA PCR Screen Detected (A) Not Detected   Wound Culture    Collection Time: 11/04/22 10:23 AM    Specimen: Head; Abscess   Result Value Ref Range    Wound Culture Many Methicillin resistant Staphylococcus aureus (A)        Susceptibility    Methicillin resistant Staphylococcus aureus -  (no method available)     Oxacillin >=4 Resistant      Trimethoprim/Sulfamethoxazole <=10 Sensitive      Tetracycline <=1 Sensitive      Vancomycin 1 Sensitive    Comprehensive Metabolic Panel (CMP)    Collection Time: 11/05/22  4:13 AM   Result Value Ref Range    Sodium 142 136 - 145 mmol/L    Potassium 4.2 3.5 - 5.1 mmol/L    Chloride 104 98 - 107 mmol/L    CO2 29 22 - 29 mmol/L    Glucose 103 (H) 74 - 100 mg/dL    Blood Urea Nitrogen 11.5 8.9 - 20.6 mg/dL    Creatinine 0.70 (L) 0.73 - 1.18 mg/dL    Calcium 9.3 8.4 - 10.2 mg/dL    Protein Total 6.1 (L) 6.4 - 8.3 gm/dL    Albumin 3.3 (L) 3.5 - 5.0 gm/dL    Globulin 2.8 2.4 - 3.5 gm/dL    Albumin/Globulin Ratio 1.2 1.1 - 2.0 ratio    Bilirubin Total 0.4 <=1.5 mg/dL    ALP 84 40 - 150 unit/L    ALT 18 0 - 55 unit/L    AST 16 5 - 34 unit/L    eGFR >60 mls/min/1.73/m2    Magnesium    Collection Time: 11/05/22  4:13 AM   Result Value Ref Range    Magnesium Level 2.00 1.60 - 2.60 mg/dL   Phosphorus    Collection Time: 11/05/22  4:13 AM   Result Value Ref Range    Phosphorus Level 3.2 2.3 - 4.7 mg/dL   Lipid panel    Collection Time: 11/05/22  4:13 AM   Result Value Ref Range    Cholesterol Total 189 <=200 mg/dL    HDL Cholesterol 35 35 - 60 mg/dL    Triglyceride 70 34 - 140 mg/dL    Cholesterol/HDL Ratio 5 0 - 5    Very Low Density Lipoprotein 14     LDL Cholesterol 140.00 50.00 - 140.00 mg/dL   CBC with Differential    Collection Time: 11/05/22  4:13 AM   Result Value Ref Range    WBC 6.0 4.5 - 11.5 x10(3)/mcL    RBC 4.24 (L) 4.70 - 6.10 x10(6)/mcL    Hgb 13.3 (L) 14.0 - 18.0 gm/dL    Hct 38.5 (L) 42.0 - 52.0 %    MCV 90.8 80.0 - 94.0 fL    MCH 31.4 (H) 27.0 - 31.0 pg    MCHC 34.5 33.0 - 36.0 mg/dL    RDW 11.5 11.5 - 17.0 %    Platelet 234 130 - 400 x10(3)/mcL    MPV 8.8 7.4 - 10.4 fL    Neut % 58.2 %    Lymph % 26.5 %    Mono % 9.2 %    Eos % 4.7 %    Basophil % 0.7 %    Lymph # 1.58 0.6 - 4.6 x10(3)/mcL    Neut # 3.5 2.1 - 9.2 x10(3)/mcL    Mono # 0.55 0.1 - 1.3 x10(3)/mcL    Eos # 0.28 0 - 0.9 x10(3)/mcL    Baso # 0.04 0 - 0.2 x10(3)/mcL    Imm Gran # 0.04 0 - 0.04 x10(3)/mcL    Imm Grans % 0.7 %    NRBC% 0.0 %   VANCOMYCIN, TROUGH    Collection Time: 11/05/22 10:21 AM   Result Value Ref Range    Vancomycin Trough 13.0 (L) 15.0 - 20.0 ug/ml   Comprehensive Metabolic Panel (CMP)    Collection Time: 11/06/22  4:22 AM   Result Value Ref Range    Sodium 140 136 - 145 mmol/L    Potassium 4.1 3.5 - 5.1 mmol/L    Chloride 102 98 - 107 mmol/L    CO2 31 (H) 22 - 29 mmol/L    Glucose 107 (H) 74 - 100 mg/dL    Blood Urea Nitrogen 10.1 8.9 - 20.6 mg/dL    Creatinine 0.78 0.73 - 1.18 mg/dL    Calcium 9.8 8.4 - 10.2 mg/dL    Protein Total 6.7 6.4 - 8.3 gm/dL    Albumin 3.3 (L) 3.5 - 5.0 gm/dL    Globulin 3.4 2.4 - 3.5 gm/dL    Albumin/Globulin Ratio 1.0 (L) 1.1 - 2.0 ratio    Bilirubin Total  0.3 <=1.5 mg/dL    ALP 78 40 - 150 unit/L    ALT 18 0 - 55 unit/L    AST 15 5 - 34 unit/L    eGFR >60 mls/min/1.73/m2   Magnesium    Collection Time: 11/06/22  4:22 AM   Result Value Ref Range    Magnesium Level 2.00 1.60 - 2.60 mg/dL   Phosphorus    Collection Time: 11/06/22  4:22 AM   Result Value Ref Range    Phosphorus Level 3.4 2.3 - 4.7 mg/dL   CBC with Differential    Collection Time: 11/06/22  4:22 AM   Result Value Ref Range    WBC 6.4 4.5 - 11.5 x10(3)/mcL    RBC 4.37 (L) 4.70 - 6.10 x10(6)/mcL    Hgb 13.5 (L) 14.0 - 18.0 gm/dL    Hct 39.2 (L) 42.0 - 52.0 %    MCV 89.7 80.0 - 94.0 fL    MCH 30.9 27.0 - 31.0 pg    MCHC 34.4 33.0 - 36.0 mg/dL    RDW 11.3 (L) 11.5 - 17.0 %    Platelet 243 130 - 400 x10(3)/mcL    MPV 8.8 7.4 - 10.4 fL    Neut % 59.8 %    Lymph % 26.0 %    Mono % 9.2 %    Eos % 3.8 %    Basophil % 0.6 %    Lymph # 1.66 0.6 - 4.6 x10(3)/mcL    Neut # 3.8 2.1 - 9.2 x10(3)/mcL    Mono # 0.59 0.1 - 1.3 x10(3)/mcL    Eos # 0.24 0 - 0.9 x10(3)/mcL    Baso # 0.04 0 - 0.2 x10(3)/mcL    Imm Gran # 0.04 0 - 0.04 x10(3)/mcL    Imm Grans % 0.6 %    NRBC% 0.0 %   VANCOMYCIN, TROUGH    Collection Time: 11/06/22 10:42 AM   Result Value Ref Range    Vancomycin Trough 11.8 (L) 15.0 - 20.0 ug/ml     Assessment/Plan:   Localized bacterial skin infection  -     sulfamethoxazole-trimethoprim 800-160mg (BACTRIM DS) 800-160 mg Tab; Take 1 tablet by mouth 2 (two) times daily. for 7 days  Dispense: 14 tablet; Refill: 0  -     predniSONE (DELTASONE) 20 MG tablet; Take 1 tablet (20 mg total) by mouth 2 (two) times daily. for 2 days  Dispense: 4 tablet; Refill: 0  -     mupirocin (BACTROBAN) 2 % ointment; Apply topically 3 (three) times daily. for 10 days  Dispense: 22 g; Refill: 0    History of MRSA infection  Coverage as above.  Return to clinic/follow up with PCP should symptoms progress or worsen         Education and counseling done face to face regarding medical conditions and plan. Contact office if new symptoms  develop. Should any symptoms ever significantly worsen seek emergency medical attention/go to ER. Follow up at least yearly for wellness or sooner PRN. Nurse to call patient with any results. The patient is receptive, expresses understanding and is agreeable to plan. All questions have been answered.             [1]   Social History  Socioeconomic History    Marital status:    Tobacco Use    Smoking status: Former     Types: Cigarettes     Passive exposure: Past    Smokeless tobacco: Never   Substance and Sexual Activity    Alcohol use: Not Currently

## 2025-03-13 ENCOUNTER — TELEPHONE (OUTPATIENT)
Dept: URGENT CARE | Facility: CLINIC | Age: 45
End: 2025-03-13
Payer: MEDICAID

## 2025-03-13 RX ORDER — PREDNISONE 20 MG/1
20 TABLET ORAL 2 TIMES DAILY
Qty: 6 TABLET | Refills: 0 | Status: SHIPPED | OUTPATIENT
Start: 2025-03-13 | End: 2025-03-16

## 2025-03-13 NOTE — TELEPHONE ENCOUNTER
I will send in some prednisone to aid in the rash / swelling. Continue to apply the topical abx. No need to additional repeat oral abx at this time.

## 2025-03-13 NOTE — TELEPHONE ENCOUNTER
Patient seen 3/7 for possible skin infection to the chin x 1 week onset intermittently. Describes s/s that resolve, then gradually rebound. Attributes onset to infected hair follicle coupled with possible allergy to unspecified shaving oil. Noting symptoms gradually rebounded this am after reapplication of shaving oil product.       Localized bacterial skin infection  -     sulfamethoxazole-trimethoprim 800-160mg (BACTRIM DS) 800-160 mg Tab; Take 1 tablet by mouth 2 (two) times daily. for 7 days  Dispense: 14 tablet; Refill: 0  -     predniSONE (DELTASONE) 20 MG tablet; Take 1 tablet (20 mg total) by mouth 2 (two) times daily. for 2 days  Dispense: 4 tablet; Refill: 0  -     mupirocin (BACTROBAN) 2 % ointment; Apply topically 3 (three) times daily. for 10 days  Dispense: 22 g; Refill: 0      Patient called today asking for an extension of the Bactrim to fully clear the rash, states its almost fully gone. Please advise

## 2025-05-19 ENCOUNTER — OFFICE VISIT (OUTPATIENT)
Dept: URGENT CARE | Facility: CLINIC | Age: 45
End: 2025-05-19
Payer: MEDICAID

## 2025-05-19 VITALS
RESPIRATION RATE: 18 BRPM | DIASTOLIC BLOOD PRESSURE: 80 MMHG | HEIGHT: 72 IN | TEMPERATURE: 98 F | BODY MASS INDEX: 27.09 KG/M2 | HEART RATE: 107 BPM | WEIGHT: 200 LBS | SYSTOLIC BLOOD PRESSURE: 158 MMHG | OXYGEN SATURATION: 99 %

## 2025-05-19 DIAGNOSIS — L01.00 IMPETIGO: Primary | ICD-10-CM

## 2025-05-19 PROCEDURE — 99213 OFFICE O/P EST LOW 20 MIN: CPT | Mod: ,,, | Performed by: FAMILY MEDICINE

## 2025-05-19 RX ORDER — SULFAMETHOXAZOLE AND TRIMETHOPRIM 800; 160 MG/1; MG/1
1 TABLET ORAL 2 TIMES DAILY
Qty: 14 TABLET | Refills: 0 | Status: SHIPPED | OUTPATIENT
Start: 2025-05-19 | End: 2025-05-26

## 2025-05-19 RX ORDER — MUPIROCIN 20 MG/G
OINTMENT TOPICAL 3 TIMES DAILY
Qty: 15 G | Refills: 0 | Status: SHIPPED | OUTPATIENT
Start: 2025-05-19

## 2025-05-19 NOTE — PROGRESS NOTES
Subjective:      Patient ID: Jude Lejeune is a 44 y.o. male.    Vitals:  height is 6' (1.829 m) and weight is 90.7 kg (200 lb). His oral temperature is 98.4 °F (36.9 °C). His blood pressure is 158/80 (abnormal) and his pulse is 107. His respiration is 18 and oxygen saturation is 99%.     Chief Complaint: Skin Problem     Patient is a 44 y.o. male who presents to urgent care with complaints of Chin and lower lip swelling, concerned for ingrown hair, x5 days. Alleviating factors include Bactroban oint with moderate amount of relief. Patient denies trauma, fever.        [place]:  44-year-old male present to clinic with concerns of lesions around the chin and lower lip since 5 days.  States had similar complaints in the past possibly from shaving with different blade.  No fever.  No bites.    ROS :  Constitutional : No Fever, No Body aches, No Chills  Neck : Negative except HPI  Respiratory : Negative for shortness of breath and wheezing  Cardiovascular : Negative for chest pain, No palpitations  Gastrointestinal : No nausea, No vomiting, No abdominal pain, No diarrhea  Muskeloskeletal : Negative except HPI  Integumentary : As Per HPI  Neurological : No tingling, No numbness, No weakness   Objective:     Physical Exam  General : Alert and oriented, No apparent distress, Afebrile  Neck -: supple, Non Tender  Respiratory :Lungs are clear to auscultate, Breath sounds are equal  Cardiovascular : Normal rate, normal volume pulse bilateral  Integumentary : Warm, Dry and discrete erythematous honey-crusted 7-8 mm lesions lower aspect of the chin clinically consistent with impetigo, 0.5 cm erythematous lesion lower lip on left side, externally, sensitive to touch.  Neurologic : Alert and Oriented X 4, sensations intact, motor intact   Assessment:     1. Impetigo      Plan:   Discussed the physical finding, condition and course.  Hand hygiene.  Adequate hydration.  Start antibiotics as prescribed.  Avoid shaving in the area until  symptoms improve.  Throw away all shaving blades to avoid reexposure  Tylenol or ibuprofen for pain and discomfort  With persistent and worsening symptoms may need further evaluation  Call or return to clinic for any questions  Work excuse 2 days    Impetigo  -     mupirocin (BACTROBAN) 2 % ointment; Apply topically 3 (three) times daily.  Dispense: 15 g; Refill: 0  -     sulfamethoxazole-trimethoprim 800-160mg (BACTRIM DS) 800-160 mg Tab; Take 1 tablet by mouth 2 (two) times daily. for 7 days  Dispense: 14 tablet; Refill: 0

## 2025-05-19 NOTE — PATIENT INSTRUCTIONS
Discussed the physical finding, condition and course.  Hand hygiene.  Adequate hydration.  Start antibiotics as prescribed.  Avoid shaving in the area until symptoms improve.  Throw away all shaving blades to avoid reexposure  Tylenol or ibuprofen for pain and discomfort  With persistent and worsening symptoms may need further evaluation  Call or return to clinic for any questions  Work excuse 2 days

## 2025-06-02 ENCOUNTER — OFFICE VISIT (OUTPATIENT)
Dept: URGENT CARE | Facility: CLINIC | Age: 45
End: 2025-06-02
Payer: MEDICAID

## 2025-06-02 VITALS
HEIGHT: 72 IN | TEMPERATURE: 99 F | HEART RATE: 93 BPM | OXYGEN SATURATION: 98 % | BODY MASS INDEX: 28.44 KG/M2 | RESPIRATION RATE: 18 BRPM | DIASTOLIC BLOOD PRESSURE: 89 MMHG | SYSTOLIC BLOOD PRESSURE: 149 MMHG | WEIGHT: 210 LBS

## 2025-06-02 DIAGNOSIS — R21 RASH/SKIN ERUPTION: Primary | ICD-10-CM

## 2025-06-02 PROCEDURE — 99213 OFFICE O/P EST LOW 20 MIN: CPT | Mod: ,,, | Performed by: FAMILY MEDICINE

## 2025-06-02 RX ORDER — CLOTRIMAZOLE AND BETAMETHASONE DIPROPIONATE 10; .64 MG/G; MG/G
CREAM TOPICAL 2 TIMES DAILY
Qty: 15 G | Refills: 0 | Status: SHIPPED | OUTPATIENT
Start: 2025-06-02 | End: 2025-06-09

## 2025-06-02 RX ORDER — CEPHALEXIN 500 MG/1
500 CAPSULE ORAL EVERY 8 HOURS
Qty: 15 CAPSULE | Refills: 0 | Status: SHIPPED | OUTPATIENT
Start: 2025-06-02 | End: 2025-06-07

## 2025-06-07 ENCOUNTER — HOSPITAL ENCOUNTER (EMERGENCY)
Facility: HOSPITAL | Age: 45
Discharge: HOME | End: 2025-06-07
Attending: EMERGENCY MEDICINE
Payer: MEDICAID

## 2025-06-07 VITALS
RESPIRATION RATE: 18 BRPM | DIASTOLIC BLOOD PRESSURE: 104 MMHG | BODY MASS INDEX: 29.8 KG/M2 | OXYGEN SATURATION: 98 % | TEMPERATURE: 98 F | WEIGHT: 220 LBS | HEART RATE: 93 BPM | SYSTOLIC BLOOD PRESSURE: 145 MMHG | HEIGHT: 72 IN

## 2025-06-07 DIAGNOSIS — R21 RASH AND NONSPECIFIC SKIN ERUPTION: Primary | ICD-10-CM

## 2025-06-07 LAB
ALBUMIN SERPL-MCNC: 3.9 G/DL (ref 3.5–5)
ALBUMIN/GLOB SERPL: 1 RATIO (ref 1.1–2)
ALP SERPL-CCNC: 88 UNIT/L (ref 40–150)
ALT SERPL-CCNC: 44 UNIT/L (ref 0–55)
ANION GAP SERPL CALC-SCNC: 11 MEQ/L
AST SERPL-CCNC: 20 UNIT/L (ref 11–45)
BASOPHILS # BLD AUTO: 0.04 X10(3)/MCL
BASOPHILS NFR BLD AUTO: 0.5 %
BILIRUB SERPL-MCNC: 0.4 MG/DL
BUN SERPL-MCNC: 15.4 MG/DL (ref 8.9–20.6)
CALCIUM SERPL-MCNC: 8.9 MG/DL (ref 8.4–10.2)
CHLORIDE SERPL-SCNC: 105 MMOL/L (ref 98–107)
CO2 SERPL-SCNC: 25 MMOL/L (ref 22–29)
CREAT SERPL-MCNC: 0.94 MG/DL (ref 0.72–1.25)
CREAT/UREA NIT SERPL: 16
EOSINOPHIL # BLD AUTO: 0.15 X10(3)/MCL (ref 0–0.9)
EOSINOPHIL NFR BLD AUTO: 1.8 %
ERYTHROCYTE [DISTWIDTH] IN BLOOD BY AUTOMATED COUNT: 12.2 % (ref 11.5–17)
GFR SERPLBLD CREATININE-BSD FMLA CKD-EPI: >60 ML/MIN/1.73/M2
GLOBULIN SER-MCNC: 3.8 GM/DL (ref 2.4–3.5)
GLUCOSE SERPL-MCNC: 103 MG/DL (ref 74–100)
HCT VFR BLD AUTO: 39.8 % (ref 42–52)
HGB BLD-MCNC: 13.7 G/DL (ref 14–18)
IMM GRANULOCYTES # BLD AUTO: 0.04 X10(3)/MCL (ref 0–0.04)
IMM GRANULOCYTES NFR BLD AUTO: 0.5 %
LYMPHOCYTES # BLD AUTO: 1.19 X10(3)/MCL (ref 0.6–4.6)
LYMPHOCYTES NFR BLD AUTO: 14.3 %
MCH RBC QN AUTO: 30.6 PG (ref 27–31)
MCHC RBC AUTO-ENTMCNC: 34.4 G/DL (ref 33–36)
MCV RBC AUTO: 88.8 FL (ref 80–94)
MONOCYTES # BLD AUTO: 0.64 X10(3)/MCL (ref 0.1–1.3)
MONOCYTES NFR BLD AUTO: 7.7 %
NEUTROPHILS # BLD AUTO: 6.26 X10(3)/MCL (ref 2.1–9.2)
NEUTROPHILS NFR BLD AUTO: 75.2 %
NRBC BLD AUTO-RTO: 0 %
PLATELET # BLD AUTO: 230 X10(3)/MCL (ref 130–400)
PMV BLD AUTO: 8.9 FL (ref 7.4–10.4)
POTASSIUM SERPL-SCNC: 4 MMOL/L (ref 3.5–5.1)
PROT SERPL-MCNC: 7.7 GM/DL (ref 6.4–8.3)
RBC # BLD AUTO: 4.48 X10(6)/MCL (ref 4.7–6.1)
SODIUM SERPL-SCNC: 141 MMOL/L (ref 136–145)
WBC # BLD AUTO: 8.32 X10(3)/MCL (ref 4.5–11.5)

## 2025-06-07 PROCEDURE — 99283 EMERGENCY DEPT VISIT LOW MDM: CPT

## 2025-06-07 PROCEDURE — 80053 COMPREHEN METABOLIC PANEL: CPT | Performed by: PHYSICIAN ASSISTANT

## 2025-06-07 PROCEDURE — 85025 COMPLETE CBC W/AUTO DIFF WBC: CPT | Performed by: PHYSICIAN ASSISTANT

## 2025-06-07 RX ORDER — MUPIROCIN 20 MG/G
OINTMENT TOPICAL 2 TIMES DAILY
Qty: 30 G | Refills: 0 | Status: ON HOLD | OUTPATIENT
Start: 2025-06-07 | End: 2025-06-14

## 2025-06-07 NOTE — ED PROVIDER NOTES
Encounter Date: 6/7/2025       History     Chief Complaint   Patient presents with    Facial Swelling     Facial swelling to chin, right ear pain  recently on abx.      The patient is a 44 y.o. male who presents to the Emergency Department with a chief complaint of chin swelling. Patient states he has been dealing with skin infection for a few months. States that he has been seen at urgent care multiple times for same. He finished cephalexin this morning. States that he was on Bactroban a few months ago which seemed to improve symptoms. Symptoms began 3 months ago and have been constant since onset. His pain is currently rated as a 4/10 in severity and described as aching with no radiation. Associated symptoms include swelling and mild erythema. Symptoms are aggravated with nothing and there are no alleviating factors. The patient denies fever or chills. He reports taking nothing prior to arrival with no relief of symptoms. No other reported symptoms at this time     The history is provided by the patient. No  was used.   Rash   This is a new problem. The current episode started several weeks ago. The problem has been unchanged. The problem is associated with nothing. The rash is present on the face. The pain is at a severity of 4/10. The pain has been Constant since onset. Treatments tried: oral antibiotics. The treatment provided no relief.     Review of patient's allergies indicates:  No Known Allergies  Past Medical History:   Diagnosis Date    Renal stones      Past Surgical History:   Procedure Laterality Date    KIDNEY STONE SURGERY       Family History   Problem Relation Name Age of Onset    No Known Problems Mother      No Known Problems Father      No Known Problems Sister       Social History[1]  Review of Systems   Constitutional:  Negative for fever.   HENT:  Negative for sore throat.    Respiratory:  Negative for shortness of breath.    Cardiovascular:  Negative for chest pain.    Gastrointestinal:  Negative for nausea.   Genitourinary:  Negative for dysuria.   Musculoskeletal:  Negative for back pain.   Skin:  Positive for color change and rash.   Neurological:  Negative for weakness.   Hematological:  Does not bruise/bleed easily.   All other systems reviewed and are negative.      Physical Exam     Initial Vitals [06/07/25 1726]   BP Pulse Resp Temp SpO2   (!) 156/93 93 18 98.3 °F (36.8 °C) 98 %      MAP       --         Physical Exam    Nursing note and vitals reviewed.  Constitutional: Vital signs are normal. He appears well-developed and well-nourished.   HENT:   Head: Normocephalic.   Right Ear: Hearing and tympanic membrane normal.   Left Ear: Hearing and tympanic membrane normal. Mouth/Throat: Uvula is midline, oropharynx is clear and moist and mucous membranes are normal.   Dried healing lesions to chin. Mild erythema. There are no areas of fluctuance or drainage.    Eyes: Conjunctivae and EOM are normal. Pupils are equal, round, and reactive to light.   Cardiovascular:  Normal rate, regular rhythm, normal heart sounds and normal pulses.           Pulmonary/Chest: Effort normal and breath sounds normal.   Abdominal: Abdomen is soft. Bowel sounds are normal. There is no abdominal tenderness.   Musculoskeletal:      Cervical back: No spinous process tenderness or muscular tenderness.     Lymphadenopathy:     He has no cervical adenopathy.   Neurological: He is alert. GCS eye subscore is 4. GCS verbal subscore is 5. GCS motor subscore is 6.   Skin: Skin is warm, dry and intact. Capillary refill takes less than 2 seconds.         ED Course   Procedures  Labs Reviewed   COMPREHENSIVE METABOLIC PANEL - Abnormal       Result Value    Sodium 141      Potassium 4.0      Chloride 105      CO2 25      Glucose 103 (*)     Blood Urea Nitrogen 15.4      Creatinine 0.94      Calcium 8.9      Protein Total 7.7      Albumin 3.9      Globulin 3.8 (*)     Albumin/Globulin Ratio 1.0 (*)     Bilirubin  Total 0.4      ALP 88      ALT 44      AST 20      eGFR >60      Anion Gap 11.0      BUN/Creatinine Ratio 16     CBC WITH DIFFERENTIAL - Abnormal    WBC 8.32      RBC 4.48 (*)     Hgb 13.7 (*)     Hct 39.8 (*)     MCV 88.8      MCH 30.6      MCHC 34.4      RDW 12.2      Platelet 230      MPV 8.9      Neut % 75.2      Lymph % 14.3      Mono % 7.7      Eos % 1.8      Basophil % 0.5      Imm Grans % 0.5      Neut # 6.26      Lymph # 1.19      Mono # 0.64      Eos # 0.15      Baso # 0.04      Imm Gran # 0.04      NRBC% 0.0     CBC W/ AUTO DIFFERENTIAL    Narrative:     The following orders were created for panel order CBC auto differential.  Procedure                               Abnormality         Status                     ---------                               -----------         ------                     CBC with Differential[[id number]]        Abnormal            Final result                 Please view results for these tests on the individual orders.          Imaging Results    None          Medications - No data to display  Medical Decision Making  The patient is a 44 y.o. male who presents to the Emergency Department with a chief complaint of chin swelling. Patient states he has been dealing with skin infection for a few months. States that he has been seen at urgent care multiple times for same. He finished cephalexin this morning. States that he was on Bactroban a few months ago which seemed to improve symptoms. Symptoms began 3 months ago and have been constant since onset. His pain is currently rated as a 4/10 in severity and described as aching with no radiation. Associated symptoms include swelling and mild erythema. Symptoms are aggravated with nothing and there are no alleviating factors. The patient denies fever or chills. He reports taking nothing prior to arrival with no relief of symptoms. No other reported symptoms at this time     Judging by the patient's chief complaint and pertinent history,  the patient has the following possible differential diagnoses, including but not limited to the following.  Some of these are deemed to be lower likelihood and some more likely based on my physical exam and history combined with possible lab work and/or imaging studies.   Please see the pertinent studies, and refer to the HPI.  Some of these diagnoses will take further evaluation to fully rule out, perhaps as an outpatient and the patient was encouraged to follow up when discharged for more comprehensive evaluation.    Cellulitis, impetigo, folliculitis     Risk  Prescription drug management.               ED Course as of 06/07/25 1929   Sat Jun 07, 2025 1919 Labs unremarkable. Patient has recently been on Bactrim and Cephalexin. Symptoms are actually improving but patient concerned that he is still having some swelling. Will add Bactroban and have patient follow up. I discussed results in detail with patient including follow up. He is amendable to plan and ready for dc home. He was given strict er return precautions.  [LM]      ED Course User Index  [LM] Rubin Decker NP                           Clinical Impression:  Final diagnoses:  [R21] Rash and nonspecific skin eruption (Primary)          ED Disposition Condition    Discharge Stable          ED Prescriptions       Medication Sig Dispense Start Date End Date Auth. Provider    mupirocin (BACTROBAN) 2 % ointment Apply topically 2 (two) times daily. for 7 days 30 g 6/7/2025 6/14/2025 Rubin Decker NP          Follow-up Information       Follow up With Specialties Details Why Contact Info    Idris Jack MD Family Medicine Schedule an appointment as soon as possible for a visit   [address] [place] [address] [phone]                     [1]   Social History  Tobacco Use    Smoking status: Former     Types: Cigarettes     Start date: 01/2025     Quit date: 2015     Years since quitting: 10.4     Passive exposure: Past    Smokeless tobacco:  Never   Substance Use Topics    Alcohol use: Not Currently    Drug use: Never        Rubin Decker NP  06/07/25 1930

## 2025-06-07 NOTE — FIRST PROVIDER EVALUATION
Medical screening examination initiated.  I have conducted a focused provider triage encounter, findings are as follows:    Brief history of present illness:  44yoWM presents to the ER with chin swelling for ingrown hair. He has been on cefdinir for 5 days and more antibiotics prior to that.     Vitals:    06/07/25 1726   BP: (!) 156/93   Pulse: 93   Resp: 18   Temp: 98.3 °F (36.8 °C)   TempSrc: Oral   SpO2: 98%   Weight: 99.8 kg (220 lb)   Height: 6' (1.829 m)       Pertinent physical exam:  NAD. Chin swelling noted.     Brief workup plan:  labs     Preliminary workup initiated; this workup will be continued and followed by the physician or advanced practice provider that is assigned to the patient when roomed.

## 2025-06-11 ENCOUNTER — HOSPITAL ENCOUNTER (INPATIENT)
Facility: HOSPITAL | Age: 45
LOS: 5 days | Discharge: HOME | DRG: 603 | End: 2025-06-16
Attending: EMERGENCY MEDICINE | Admitting: INTERNAL MEDICINE
Payer: MEDICAID

## 2025-06-11 DIAGNOSIS — L02.01 SUBMENTAL ABSCESS: Primary | ICD-10-CM

## 2025-06-11 DIAGNOSIS — L03.211 CELLULITIS OF FACE: ICD-10-CM

## 2025-06-11 DIAGNOSIS — L02.91 ABSCESS: ICD-10-CM

## 2025-06-11 DIAGNOSIS — R07.9 CHEST PAIN: ICD-10-CM

## 2025-06-11 LAB
ALBUMIN SERPL-MCNC: 4 G/DL (ref 3.5–5)
ALBUMIN/GLOB SERPL: 1 RATIO (ref 1.1–2)
ALP SERPL-CCNC: 94 UNIT/L (ref 40–150)
ALT SERPL-CCNC: 26 UNIT/L (ref 0–55)
ANION GAP SERPL CALC-SCNC: 8 MEQ/L
AST SERPL-CCNC: 14 UNIT/L (ref 11–45)
BASOPHILS # BLD AUTO: 0.03 X10(3)/MCL
BASOPHILS NFR BLD AUTO: 0.2 %
BILIRUB SERPL-MCNC: 0.6 MG/DL
BUN SERPL-MCNC: 9.2 MG/DL (ref 8.9–20.6)
CALCIUM SERPL-MCNC: 9.6 MG/DL (ref 8.4–10.2)
CHLORIDE SERPL-SCNC: 100 MMOL/L (ref 98–107)
CO2 SERPL-SCNC: 32 MMOL/L (ref 22–29)
CREAT SERPL-MCNC: 0.83 MG/DL (ref 0.72–1.25)
CREAT/UREA NIT SERPL: 11
EOSINOPHIL # BLD AUTO: 0.02 X10(3)/MCL (ref 0–0.9)
EOSINOPHIL NFR BLD AUTO: 0.2 %
ERYTHROCYTE [DISTWIDTH] IN BLOOD BY AUTOMATED COUNT: 12.3 % (ref 11.5–17)
GFR SERPLBLD CREATININE-BSD FMLA CKD-EPI: >60 ML/MIN/1.73/M2
GLOBULIN SER-MCNC: 4.1 GM/DL (ref 2.4–3.5)
GLUCOSE SERPL-MCNC: 142 MG/DL (ref 74–100)
HCT VFR BLD AUTO: 42.8 % (ref 42–52)
HGB BLD-MCNC: 14.5 G/DL (ref 14–18)
IMM GRANULOCYTES # BLD AUTO: 0.05 X10(3)/MCL (ref 0–0.04)
IMM GRANULOCYTES NFR BLD AUTO: 0.4 %
LYMPHOCYTES # BLD AUTO: 0.93 X10(3)/MCL (ref 0.6–4.6)
LYMPHOCYTES NFR BLD AUTO: 7 %
MCH RBC QN AUTO: 30.4 PG (ref 27–31)
MCHC RBC AUTO-ENTMCNC: 33.9 G/DL (ref 33–36)
MCV RBC AUTO: 89.7 FL (ref 80–94)
MONOCYTES # BLD AUTO: 0.92 X10(3)/MCL (ref 0.1–1.3)
MONOCYTES NFR BLD AUTO: 6.9 %
NEUTROPHILS # BLD AUTO: 11.32 X10(3)/MCL (ref 2.1–9.2)
NEUTROPHILS NFR BLD AUTO: 85.3 %
NRBC BLD AUTO-RTO: 0 %
PLATELET # BLD AUTO: 247 X10(3)/MCL (ref 130–400)
PMV BLD AUTO: 8.8 FL (ref 7.4–10.4)
POTASSIUM SERPL-SCNC: 4 MMOL/L (ref 3.5–5.1)
PROT SERPL-MCNC: 8.1 GM/DL (ref 6.4–8.3)
RBC # BLD AUTO: 4.77 X10(6)/MCL (ref 4.7–6.1)
SODIUM SERPL-SCNC: 140 MMOL/L (ref 136–145)
WBC # BLD AUTO: 13.27 X10(3)/MCL (ref 4.5–11.5)

## 2025-06-11 PROCEDURE — 96375 TX/PRO/DX INJ NEW DRUG ADDON: CPT

## 2025-06-11 PROCEDURE — 87040 BLOOD CULTURE FOR BACTERIA: CPT

## 2025-06-11 PROCEDURE — 25000003 PHARM REV CODE 250: Performed by: INTERNAL MEDICINE

## 2025-06-11 PROCEDURE — 25500020 PHARM REV CODE 255

## 2025-06-11 PROCEDURE — 63600175 PHARM REV CODE 636 W HCPCS: Performed by: INTERNAL MEDICINE

## 2025-06-11 PROCEDURE — 63600175 PHARM REV CODE 636 W HCPCS

## 2025-06-11 PROCEDURE — 99285 EMERGENCY DEPT VISIT HI MDM: CPT | Mod: 25

## 2025-06-11 PROCEDURE — 80053 COMPREHEN METABOLIC PANEL: CPT

## 2025-06-11 PROCEDURE — 11000001 HC ACUTE MED/SURG PRIVATE ROOM

## 2025-06-11 PROCEDURE — 96374 THER/PROPH/DIAG INJ IV PUSH: CPT

## 2025-06-11 PROCEDURE — 85025 COMPLETE CBC W/AUTO DIFF WBC: CPT

## 2025-06-11 RX ORDER — SODIUM CHLORIDE 0.9 % (FLUSH) 0.9 %
10 SYRINGE (ML) INJECTION
Status: DISCONTINUED | OUTPATIENT
Start: 2025-06-11 | End: 2025-06-16 | Stop reason: HOSPADM

## 2025-06-11 RX ORDER — ALUMINUM HYDROXIDE, MAGNESIUM HYDROXIDE, AND SIMETHICONE 1200; 120; 1200 MG/30ML; MG/30ML; MG/30ML
30 SUSPENSION ORAL 4 TIMES DAILY PRN
Status: DISCONTINUED | OUTPATIENT
Start: 2025-06-11 | End: 2025-06-16 | Stop reason: HOSPADM

## 2025-06-11 RX ORDER — CLINDAMYCIN PHOSPHATE 600 MG/50ML
600 INJECTION, SOLUTION INTRAVENOUS
Status: COMPLETED | OUTPATIENT
Start: 2025-06-11 | End: 2025-06-11

## 2025-06-11 RX ORDER — PROCHLORPERAZINE EDISYLATE 5 MG/ML
5 INJECTION INTRAMUSCULAR; INTRAVENOUS EVERY 6 HOURS PRN
Status: DISCONTINUED | OUTPATIENT
Start: 2025-06-11 | End: 2025-06-16 | Stop reason: HOSPADM

## 2025-06-11 RX ORDER — BISACODYL 10 MG/1
10 SUPPOSITORY RECTAL DAILY PRN
Status: DISCONTINUED | OUTPATIENT
Start: 2025-06-11 | End: 2025-06-16 | Stop reason: HOSPADM

## 2025-06-11 RX ORDER — IBUPROFEN 200 MG
16 TABLET ORAL
Status: DISCONTINUED | OUTPATIENT
Start: 2025-06-11 | End: 2025-06-16 | Stop reason: HOSPADM

## 2025-06-11 RX ORDER — ENOXAPARIN SODIUM 100 MG/ML
40 INJECTION SUBCUTANEOUS EVERY 24 HOURS
Status: DISCONTINUED | OUTPATIENT
Start: 2025-06-12 | End: 2025-06-16 | Stop reason: HOSPADM

## 2025-06-11 RX ORDER — TALC
6 POWDER (GRAM) TOPICAL NIGHTLY PRN
Status: DISCONTINUED | OUTPATIENT
Start: 2025-06-11 | End: 2025-06-16 | Stop reason: HOSPADM

## 2025-06-11 RX ORDER — GLUCAGON 1 MG
1 KIT INJECTION
Status: DISCONTINUED | OUTPATIENT
Start: 2025-06-11 | End: 2025-06-16 | Stop reason: HOSPADM

## 2025-06-11 RX ORDER — NALOXONE HCL 0.4 MG/ML
0.02 VIAL (ML) INJECTION
Status: DISCONTINUED | OUTPATIENT
Start: 2025-06-11 | End: 2025-06-16 | Stop reason: HOSPADM

## 2025-06-11 RX ORDER — AMOXICILLIN 250 MG
1 CAPSULE ORAL 2 TIMES DAILY PRN
Status: DISCONTINUED | OUTPATIENT
Start: 2025-06-11 | End: 2025-06-16 | Stop reason: HOSPADM

## 2025-06-11 RX ORDER — MORPHINE SULFATE 4 MG/ML
4 INJECTION, SOLUTION INTRAMUSCULAR; INTRAVENOUS
Refills: 0 | Status: COMPLETED | OUTPATIENT
Start: 2025-06-11 | End: 2025-06-11

## 2025-06-11 RX ORDER — ACETAMINOPHEN 500 MG
1000 TABLET ORAL EVERY 6 HOURS PRN
Status: DISCONTINUED | OUTPATIENT
Start: 2025-06-11 | End: 2025-06-16 | Stop reason: HOSPADM

## 2025-06-11 RX ORDER — IBUPROFEN 200 MG
24 TABLET ORAL
Status: DISCONTINUED | OUTPATIENT
Start: 2025-06-11 | End: 2025-06-16 | Stop reason: HOSPADM

## 2025-06-11 RX ORDER — CLINDAMYCIN PHOSPHATE 600 MG/50ML
600 INJECTION, SOLUTION INTRAVENOUS
Status: DISCONTINUED | OUTPATIENT
Start: 2025-06-12 | End: 2025-06-15

## 2025-06-11 RX ORDER — ONDANSETRON HYDROCHLORIDE 2 MG/ML
4 INJECTION, SOLUTION INTRAVENOUS
Status: COMPLETED | OUTPATIENT
Start: 2025-06-11 | End: 2025-06-11

## 2025-06-11 RX ORDER — ONDANSETRON HYDROCHLORIDE 2 MG/ML
4 INJECTION, SOLUTION INTRAVENOUS EVERY 4 HOURS PRN
Status: DISCONTINUED | OUTPATIENT
Start: 2025-06-11 | End: 2025-06-16 | Stop reason: HOSPADM

## 2025-06-11 RX ORDER — OXYCODONE AND ACETAMINOPHEN 5; 325 MG/1; MG/1
1 TABLET ORAL EVERY 6 HOURS PRN
Refills: 0 | Status: DISCONTINUED | OUTPATIENT
Start: 2025-06-11 | End: 2025-06-12

## 2025-06-11 RX ADMIN — MORPHINE SULFATE 4 MG: 4 INJECTION INTRAVENOUS at 06:06

## 2025-06-11 RX ADMIN — IOHEXOL 100 ML: 350 INJECTION, SOLUTION INTRAVENOUS at 07:06

## 2025-06-11 RX ADMIN — OXYCODONE HYDROCHLORIDE AND ACETAMINOPHEN 1 TABLET: 5; 325 TABLET ORAL at 09:06

## 2025-06-11 RX ADMIN — DEXTROSE MONOHYDRATE 1000 MG: 50 INJECTION, SOLUTION INTRAVENOUS at 11:06

## 2025-06-11 RX ADMIN — MORPHINE SULFATE 4 MG: 4 INJECTION INTRAVENOUS at 09:06

## 2025-06-11 RX ADMIN — ONDANSETRON 4 MG: 2 INJECTION INTRAMUSCULAR; INTRAVENOUS at 06:06

## 2025-06-11 RX ADMIN — CLINDAMYCIN PHOSPHATE 600 MG: 600 INJECTION, SOLUTION INTRAVENOUS at 08:06

## 2025-06-11 RX ADMIN — VANCOMYCIN HYDROCHLORIDE 1250 MG: 1.25 INJECTION, POWDER, LYOPHILIZED, FOR SOLUTION INTRAVENOUS at 09:06

## 2025-06-11 NOTE — FIRST PROVIDER EVALUATION
Medical screening examination initiated.  I have conducted a focused provider triage encounter, findings are as follows:    Brief history of present illness:  44 year old male presents to ER with c/o chin redness and swelling x 1 month. Symptoms worsened on yesterday. Denies fever    There were no vitals filed for this visit.    Pertinent physical exam:  awake and alert, nad    Brief workup plan:  labs     Preliminary workup initiated; this workup will be continued and followed by the physician or advanced practice provider that is assigned to the patient when roomed.

## 2025-06-11 NOTE — ED PROVIDER NOTES
Encounter Date: 6/11/2025       History     Chief Complaint   Patient presents with    Abscess     Patient arrives [facility], ambulatory in triage. Reports was seen over the weekend for abscess on chin; sent home with Bactrim and face ointment. Symptoms have worsened and swelling to face has increased. Patient is afebrile in triage. GERRI LOERA     44 y.o. White male presents to Emergency Department with a chief complaint of facial abscess. Symptoms began several weeks ago and have been worsening since onset. Patient has been on Bactrim, Bactroban, and Keflex without improvement. Associated symptoms include redness, tenderness, swelling, and fluctuance to chin. Symptoms are aggravated with palpation and there are no alleviating factors. The patient denies CP, SOB, fever, chills, vomiting, or dizziness. No other reported symptoms at this time. PCP: Idris Jack       The history is provided by the patient. No  was used.   Abscess   This is a new problem. The current episode started several weeks ago. The problem occurs continuously. The problem has been gradually worsening. The abscess is present on the face. The abscess is characterized by redness, painfulness and swelling. Pertinent negatives include no fever, no diarrhea, no vomiting, no rhinorrhea, no sore throat and no decreased responsiveness. There were no sick contacts. Recently, medical care has been given at another facility and at this facility. Services received include medications given and tests performed.     Review of patient's allergies indicates:  No Known Allergies  Past Medical History:   Diagnosis Date    Renal stones      Past Surgical History:   Procedure Laterality Date    KIDNEY STONE SURGERY       Family History   Problem Relation Name Age of Onset    No Known Problems Mother      No Known Problems Father      No Known Problems Sister       Social History[1]  Review of Systems   Constitutional:  Negative for chills, decreased  responsiveness, fatigue and fever.   HENT:  Positive for facial swelling. Negative for rhinorrhea and sore throat.    Eyes:  Negative for photophobia and visual disturbance.   Respiratory:  Negative for shortness of breath and stridor.    Gastrointestinal:  Negative for diarrhea, nausea and vomiting.   Skin:  Positive for color change.   All other systems reviewed and are negative.      Physical Exam     Initial Vitals [06/11/25 1440]   BP Pulse Resp Temp SpO2   (!) 143/95 95 14 98.1 °F (36.7 °C) 97 %      MAP       --         Physical Exam    Nursing note and vitals reviewed.  Constitutional: He appears well-developed and well-nourished. He is not diaphoretic. He is cooperative.  Non-toxic appearance. No distress.   HENT:   Head: Normocephalic and atraumatic.       Right Ear: External ear normal.   Left Ear: External ear normal.   Nose: Nose normal.   Tenderness, redness, swelling, and fluctuance noted to outlined area.    Eyes: Conjunctivae and EOM are normal. Pupils are equal, round, and reactive to light.   Neck: Neck supple. No thyromegaly present. No tracheal deviation present.   Normal range of motion.  Cardiovascular:  Normal rate, regular rhythm, S1 normal, S2 normal, normal heart sounds, intact distal pulses and normal pulses.           Pulmonary/Chest: Effort normal and breath sounds normal. No accessory muscle usage or stridor. No tachypnea and no bradypnea. No respiratory distress. He has no decreased breath sounds. He has no wheezes. He has no rhonchi. He has no rales. He exhibits no tenderness.   Abdominal: Abdomen is soft. Bowel sounds are normal. He exhibits no distension. There is no abdominal tenderness. There is no rebound.   Musculoskeletal:         General: Normal range of motion.      Cervical back: Normal range of motion and neck supple.     Neurological: He is alert and oriented to person, place, and time. He has normal strength. No sensory deficit. GCS score is 15. GCS eye subscore is 4.  GCS verbal subscore is 5. GCS motor subscore is 6.   Skin: Skin is warm and dry. Capillary refill takes less than 2 seconds. Abscess noted. There is erythema.   Psychiatric: He has a normal mood and affect. Thought content normal.               ED Course   Procedures  Labs Reviewed   COMPREHENSIVE METABOLIC PANEL - Abnormal       Result Value    Sodium 140      Potassium 4.0      Chloride 100      CO2 32 (*)     Glucose 142 (*)     Blood Urea Nitrogen 9.2      Creatinine 0.83      Calcium 9.6      Protein Total 8.1      Albumin 4.0      Globulin 4.1 (*)     Albumin/Globulin Ratio 1.0 (*)     Bilirubin Total 0.6      ALP 94      ALT 26      AST 14      eGFR >60      Anion Gap 8.0      BUN/Creatinine Ratio 11     CBC WITH DIFFERENTIAL - Abnormal    WBC 13.27 (*)     RBC 4.77      Hgb 14.5      Hct 42.8      MCV 89.7      MCH 30.4      MCHC 33.9      RDW 12.3      Platelet 247      MPV 8.8      Neut % 85.3      Lymph % 7.0      Mono % 6.9      Eos % 0.2      Basophil % 0.2      Imm Grans % 0.4      Neut # 11.32 (*)     Lymph # 0.93      Mono # 0.92      Eos # 0.02      Baso # 0.03      Imm Gran # 0.05 (*)     NRBC% 0.0     BLOOD CULTURE OLG   BLOOD CULTURE OLG   CBC W/ AUTO DIFFERENTIAL    Narrative:     The following orders were created for panel order CBC auto differential.  Procedure                               Abnormality         Status                     ---------                               -----------         ------                     CBC with Differential[[id number]]        Abnormal            Final result                 Please view results for these tests on the individual orders.   DRUG SCREEN, URINE (BEAKER)          Imaging Results              CT Maxillofacial With Contrast (Final result)  Result time 06/11/25 19:41:05      Final result by Carlos Iglesias MD (06/11/25 19:41:05)                   Impression:      1. Abundant soft tissue inflammation consistent with cellulitis and submental small  fluid collection suspected for an abscess.  No adjacent mandible erosive changes or osteomyelitis.    2. Details of other findings above      Electronically signed by: Carlos Iglesias  Date:    06/11/2025  Time:    19:41               Narrative:    EXAMINATION:  CT MAXILLOFACIAL WITH CONTRAST    CLINICAL HISTORY:  Maxillary/facial abscess;    TECHNIQUE:  Multidetector axial performed maxillofacial following intravenous administration of contrast images are reconstructed    Dose length product was 1106 mGycm. Automated radiation control was utilized to minimize radiation dose.    COMPARISON:  None available.    FINDINGS:  There is abundant soft tissue inflammation about the chin which from the midline proceeds both right laterally and left laterally.  There is submental fluid collection measuring 1.8 cm suggestive of an abscess.  There are no adjacent mandible lytic erosive changes to suggest osteomyelitis.    There are no periorbital inflammations.  No postseptal orbital inflammation or abnormal enhancement.  Globes are intact.  No thickening inflammation of the ocular muscles.    The oropharynx, hypopharynx and the larynx is unremarkable.  There is no thickening or inflammation of the epiglottis.  Salivary and the parotid glands appear to be unremarkable.  Right thyroid gland is larger compared left is suspected nodule.  Please further assess with ultrasound exam.    No maxillofacial acute fracture deformity identified.  There is left maxillary sinus small retention cyst.  Bilateral nasal passageways are narrowed due to hypertrophic rhinitis which is more pronounced on the right.  Nasal septum deviates to the left.  There is right middle turbinate josh bullosa.                                       Medications   clindamycin in D5W 600 mg/50 mL IVPB 600 mg (has no administration in time range)   sodium chloride 0.9% flush 10 mL (has no administration in time range)   melatonin tablet 6 mg (has no administration in  time range)   ondansetron injection 4 mg (has no administration in time range)   prochlorperazine injection Soln 5 mg (has no administration in time range)   senna-docusate 8.6-50 mg per tablet 1 tablet (has no administration in time range)   bisacodyL suppository 10 mg (has no administration in time range)   aluminum-magnesium hydroxide-simethicone 200-200-20 mg/5 mL suspension 30 mL (has no administration in time range)   acetaminophen tablet 1,000 mg (has no administration in time range)   naloxone 0.4 mg/mL injection 0.02 mg (has no administration in time range)   glucose chewable tablet 16 g (has no administration in time range)   glucose chewable tablet 24 g (has no administration in time range)   dextrose 50% injection 12.5 g (has no administration in time range)   dextrose 50% injection 25 g (has no administration in time range)   glucagon (human recombinant) injection 1 mg (has no administration in time range)   enoxaparin injection 40 mg (has no administration in time range)   vancomycin 1,250 mg in D5W 250 mL IVPB (admixture device) (has no administration in time range)     Followed by   vancomycin (VANCOCIN) 1,000 mg in D5W 250 mL IVPB (admixture device) (has no administration in time range)   vancomycin - pharmacy to dose (has no administration in time range)   vancomycin 1,500 mg in D5W 250 mL IVPB (admixture device) (has no administration in time range)   morphine injection 4 mg (4 mg Intravenous Given 6/11/25 1850)   ondansetron injection 4 mg (4 mg Intravenous Given 6/11/25 1850)   iohexoL (OMNIPAQUE 350) injection 100 mL (100 mLs Intravenous Given 6/11/25 1919)     Medical Decision Making  Patient awake, alert, has non-labored breathing, and follows commands appropriately. Arrived to ED due to facial abscess that began several weeks ago. Afebrile. Has been on abx outpatient without improvement. NAD noted.     Judging by the patient's chief complaint and pertinent history, the patient has the  following possible differential diagnoses, including but not limited to the following: Facial Cellulitis, Abscess, Sepsis, Facial Pain    Some of these are deemed to be lower likelihood and some more likely based on my physical exam and history combined with possible lab work and/or imaging studies. Please see the pertinent studies, and refer to the HPI. Some of these diagnoses will take further evaluation to fully rule out, perhaps as an outpatient and the patient was encouraged to follow up when discharged for more comprehensive evaluation.       Amount and/or Complexity of Data Reviewed  Labs: ordered. Decision-making details documented in ED Course.     Details: Leukocytosis noted. No electrolyte derangement noted. Informed patient of results.   Radiology: ordered. Decision-making details documented in ED Course.     Details: Informed patient of results.   Discussion of management or test interpretation with external provider(s): Due to patient's ongoing symptoms despite abx therapy and location of symptoms, will admit to  services for further evaluation. Discussed plan of care and interventions with patient. Agreed to and aware of plan of care. Comfortable being admitted.     Risk  Decision regarding hospitalization.               ED Course as of 06/11/25 2050 Wed Jun 11, 2025   1838 WBC(!): 13.27 [JA]   1838 Hemoglobin: 14.5 [JA]   1838 Hematocrit: 42.8 [JA]   1838 BUN: 9.2 [JA]   1841 Chart review reveals patient seen at [facility] on 06/7/25 for same complaints, rash appeared to be improving. Discharged home on Bactroban.  [JA]   2001 CT Maxillofacial With Contrast  1. Abundant soft tissue inflammation consistent with cellulitis and submental small fluid collection suspected for an abscess.  No adjacent mandible erosive changes or osteomyelitis. 2. Details of other findings above      [JA]   2009 Discussed patient with Dr. Grullon. Will admit to  services for IV abx.  [JA]   2011  paged.  [JA]   2017  Discussed patient with AQUILES Snyder, with  services. Will consult ENT, give 1 dose of Vanc, and hold wound culture for now. Services to admit. No further instruction or recommendations given.  [JA]   2047 Discussed patient with Dr. Turner with ENT. Will consult services. To given Clindamycin and Vanc. No further instruction or recommendations given. [JA]      ED Course User Index  [JA] Ashleigh Shukla NP                           Clinical Impression:  Final diagnoses:  [L03.211] Cellulitis of face (Primary)  [L02.91] Abscess          ED Disposition Condition    Admit                       [1]   Social History  Tobacco Use    Smoking status: Former     Types: Cigarettes     Start date: 01/2025     Quit date: 2015     Years since quitting: 10.4     Passive exposure: Past    Smokeless tobacco: Never   Substance Use Topics    Alcohol use: Not Currently    Drug use: Never        Ashleigh Shukla NP  06/11/25 2050

## 2025-06-12 LAB
ACCEPTIBLE SP GR UR QL: 1.02 (ref 1–1.03)
ALBUMIN SERPL-MCNC: 3.6 G/DL (ref 3.5–5)
ALBUMIN/GLOB SERPL: 0.9 RATIO (ref 1.1–2)
ALP SERPL-CCNC: 88 UNIT/L (ref 40–150)
ALT SERPL-CCNC: 20 UNIT/L (ref 0–55)
AMPHET UR QL SCN: NEGATIVE
ANION GAP SERPL CALC-SCNC: 9 MEQ/L
AST SERPL-CCNC: 13 UNIT/L (ref 11–45)
BARBITURATE SCN PRESENT UR: NEGATIVE
BASOPHILS # BLD AUTO: 0.04 X10(3)/MCL
BASOPHILS NFR BLD AUTO: 0.3 %
BENZODIAZ UR QL SCN: NEGATIVE
BILIRUB SERPL-MCNC: 0.7 MG/DL
BUN SERPL-MCNC: 6.5 MG/DL (ref 8.9–20.6)
CALCIUM SERPL-MCNC: 9.3 MG/DL (ref 8.4–10.2)
CANNABINOIDS UR QL SCN: POSITIVE
CHLORIDE SERPL-SCNC: 99 MMOL/L (ref 98–107)
CO2 SERPL-SCNC: 30 MMOL/L (ref 22–29)
COCAINE UR QL SCN: NEGATIVE
CREAT SERPL-MCNC: 0.69 MG/DL (ref 0.72–1.25)
CREAT/UREA NIT SERPL: 9
EOSINOPHIL # BLD AUTO: 0.1 X10(3)/MCL (ref 0–0.9)
EOSINOPHIL NFR BLD AUTO: 0.8 %
ERYTHROCYTE [DISTWIDTH] IN BLOOD BY AUTOMATED COUNT: 12.2 % (ref 11.5–17)
FENTANYL UR QL SCN: NEGATIVE
GFR SERPLBLD CREATININE-BSD FMLA CKD-EPI: >60 ML/MIN/1.73/M2
GLOBULIN SER-MCNC: 3.9 GM/DL (ref 2.4–3.5)
GLUCOSE SERPL-MCNC: 119 MG/DL (ref 74–100)
HCT VFR BLD AUTO: 41.4 % (ref 42–52)
HGB BLD-MCNC: 14.1 G/DL (ref 14–18)
IMM GRANULOCYTES # BLD AUTO: 0.05 X10(3)/MCL (ref 0–0.04)
IMM GRANULOCYTES NFR BLD AUTO: 0.4 %
LYMPHOCYTES # BLD AUTO: 1.11 X10(3)/MCL (ref 0.6–4.6)
LYMPHOCYTES NFR BLD AUTO: 9.1 %
MAGNESIUM SERPL-MCNC: 2 MG/DL (ref 1.6–2.6)
MCH RBC QN AUTO: 30.2 PG (ref 27–31)
MCHC RBC AUTO-ENTMCNC: 34.1 G/DL (ref 33–36)
MCV RBC AUTO: 88.7 FL (ref 80–94)
MDMA UR QL SCN: NEGATIVE
MONOCYTES # BLD AUTO: 1.15 X10(3)/MCL (ref 0.1–1.3)
MONOCYTES NFR BLD AUTO: 9.4 %
NEUTROPHILS # BLD AUTO: 9.74 X10(3)/MCL (ref 2.1–9.2)
NEUTROPHILS NFR BLD AUTO: 80 %
NRBC BLD AUTO-RTO: 0 %
OPIATES UR QL SCN: POSITIVE
PCP UR QL: NEGATIVE
PH UR: 6 [PH] (ref 3–11)
PLATELET # BLD AUTO: 246 X10(3)/MCL (ref 130–400)
PMV BLD AUTO: 9 FL (ref 7.4–10.4)
POTASSIUM SERPL-SCNC: 3.5 MMOL/L (ref 3.5–5.1)
PROT SERPL-MCNC: 7.5 GM/DL (ref 6.4–8.3)
RBC # BLD AUTO: 4.67 X10(6)/MCL (ref 4.7–6.1)
SODIUM SERPL-SCNC: 138 MMOL/L (ref 136–145)
WBC # BLD AUTO: 12.19 X10(3)/MCL (ref 4.5–11.5)

## 2025-06-12 PROCEDURE — 87075 CULTR BACTERIA EXCEPT BLOOD: CPT | Performed by: OTOLARYNGOLOGY

## 2025-06-12 PROCEDURE — 25000003 PHARM REV CODE 250: Performed by: INTERNAL MEDICINE

## 2025-06-12 PROCEDURE — 87077 CULTURE AEROBIC IDENTIFY: CPT | Performed by: OTOLARYNGOLOGY

## 2025-06-12 PROCEDURE — 80307 DRUG TEST PRSMV CHEM ANLYZR: CPT | Performed by: NURSE PRACTITIONER

## 2025-06-12 PROCEDURE — 27000207 HC ISOLATION

## 2025-06-12 PROCEDURE — 85025 COMPLETE CBC W/AUTO DIFF WBC: CPT | Performed by: NURSE PRACTITIONER

## 2025-06-12 PROCEDURE — 11000001 HC ACUTE MED/SURG PRIVATE ROOM

## 2025-06-12 PROCEDURE — 0J910ZZ DRAINAGE OF FACE SUBCUTANEOUS TISSUE AND FASCIA, OPEN APPROACH: ICD-10-PCS | Performed by: OTOLARYNGOLOGY

## 2025-06-12 PROCEDURE — 80053 COMPREHEN METABOLIC PANEL: CPT | Performed by: NURSE PRACTITIONER

## 2025-06-12 PROCEDURE — 83735 ASSAY OF MAGNESIUM: CPT | Performed by: NURSE PRACTITIONER

## 2025-06-12 PROCEDURE — 36415 COLL VENOUS BLD VENIPUNCTURE: CPT | Performed by: NURSE PRACTITIONER

## 2025-06-12 PROCEDURE — 63600175 PHARM REV CODE 636 W HCPCS: Performed by: NURSE PRACTITIONER

## 2025-06-12 PROCEDURE — 63600175 PHARM REV CODE 636 W HCPCS: Performed by: INTERNAL MEDICINE

## 2025-06-12 PROCEDURE — 63600175 PHARM REV CODE 636 W HCPCS: Performed by: OTOLARYNGOLOGY

## 2025-06-12 RX ORDER — MORPHINE SULFATE 4 MG/ML
2 INJECTION, SOLUTION INTRAMUSCULAR; INTRAVENOUS ONCE
Status: COMPLETED | OUTPATIENT
Start: 2025-06-12 | End: 2025-06-12

## 2025-06-12 RX ORDER — OXYCODONE AND ACETAMINOPHEN 10; 325 MG/1; MG/1
1 TABLET ORAL EVERY 6 HOURS PRN
Refills: 0 | Status: DISCONTINUED | OUTPATIENT
Start: 2025-06-12 | End: 2025-06-16 | Stop reason: HOSPADM

## 2025-06-12 RX ORDER — LOSARTAN POTASSIUM 25 MG/1
25 TABLET ORAL DAILY
Status: DISCONTINUED | OUTPATIENT
Start: 2025-06-12 | End: 2025-06-16 | Stop reason: HOSPADM

## 2025-06-12 RX ORDER — HYDROCODONE BITARTRATE AND ACETAMINOPHEN 7.5; 325 MG/1; MG/1
1 TABLET ORAL EVERY 6 HOURS PRN
Refills: 0 | Status: DISCONTINUED | OUTPATIENT
Start: 2025-06-12 | End: 2025-06-12

## 2025-06-12 RX ORDER — HYDROMORPHONE HYDROCHLORIDE 2 MG/ML
0.5 INJECTION, SOLUTION INTRAMUSCULAR; INTRAVENOUS; SUBCUTANEOUS EVERY 4 HOURS PRN
Status: DISCONTINUED | OUTPATIENT
Start: 2025-06-12 | End: 2025-06-16 | Stop reason: HOSPADM

## 2025-06-12 RX ORDER — LABETALOL HYDROCHLORIDE 5 MG/ML
10 INJECTION, SOLUTION INTRAVENOUS EVERY 4 HOURS PRN
Status: DISCONTINUED | OUTPATIENT
Start: 2025-06-12 | End: 2025-06-16 | Stop reason: HOSPADM

## 2025-06-12 RX ORDER — HYDRALAZINE HYDROCHLORIDE 20 MG/ML
10 INJECTION INTRAMUSCULAR; INTRAVENOUS EVERY 4 HOURS PRN
Status: DISCONTINUED | OUTPATIENT
Start: 2025-06-12 | End: 2025-06-16 | Stop reason: HOSPADM

## 2025-06-12 RX ADMIN — CLINDAMYCIN PHOSPHATE 600 MG: 600 INJECTION, SOLUTION INTRAVENOUS at 12:06

## 2025-06-12 RX ADMIN — VANCOMYCIN HYDROCHLORIDE 1500 MG: 1.5 INJECTION, POWDER, LYOPHILIZED, FOR SOLUTION INTRAVENOUS at 09:06

## 2025-06-12 RX ADMIN — CLINDAMYCIN PHOSPHATE 600 MG: 600 INJECTION, SOLUTION INTRAVENOUS at 09:06

## 2025-06-12 RX ADMIN — OXYCODONE HYDROCHLORIDE AND ACETAMINOPHEN 1 TABLET: 5; 325 TABLET ORAL at 04:06

## 2025-06-12 RX ADMIN — ENOXAPARIN SODIUM 40 MG: 40 INJECTION SUBCUTANEOUS at 04:06

## 2025-06-12 RX ADMIN — LOSARTAN POTASSIUM 25 MG: 25 TABLET, FILM COATED ORAL at 09:06

## 2025-06-12 RX ADMIN — VANCOMYCIN HYDROCHLORIDE 1500 MG: 1.5 INJECTION, POWDER, LYOPHILIZED, FOR SOLUTION INTRAVENOUS at 04:06

## 2025-06-12 RX ADMIN — MORPHINE SULFATE 2 MG: 4 INJECTION INTRAVENOUS at 06:06

## 2025-06-12 RX ADMIN — CLINDAMYCIN PHOSPHATE 600 MG: 600 INJECTION, SOLUTION INTRAVENOUS at 04:06

## 2025-06-12 RX ADMIN — OXYCODONE HYDROCHLORIDE AND ACETAMINOPHEN 1 TABLET: 5; 325 TABLET ORAL at 10:06

## 2025-06-12 RX ADMIN — HYDROMORPHONE HYDROCHLORIDE 0.5 MG: 2 INJECTION INTRAMUSCULAR; INTRAVENOUS; SUBCUTANEOUS at 09:06

## 2025-06-12 NOTE — PLAN OF CARE
Patient lives with significant other in a single story home. Patient reports being independent at home. Patient does not have a PCP, will add referral line to follow-ups to be called at discharge.        06/12/25 1049   Discharge Assessment   Assessment Type Discharge Planning Assessment   Confirmed/corrected address, phone number and insurance Yes   Confirmed Demographics Correct on Facesheet   Source of Information patient   People in Home significant other   Name(s) of People in Home Sridevi Armenta (Significant other) [phone]   Do you expect to return to your current living situation? Yes   Do you have help at home or someone to help you manage your care at home? Yes   Prior to hospitilization cognitive status: Alert/Oriented   Current cognitive status: Alert/Oriented   Walking or Climbing Stairs Difficulty no   Dressing/Bathing Difficulty no   Home Accessibility wheelchair accessible   Home Layout Able to live on 1st floor   Equipment Currently Used at Home none   Readmission within 30 days? No   Patient currently being followed by outpatient case management? No   Do you currently have service(s) that help you manage your care at home? No   Do you take prescription medications? No   How do you get to doctors appointments? car, drives self   Discharge Plan A Home with family   Discharge Plan B Home   DME Needed Upon Discharge  none   Discharge Plan discussed with: Patient;Parent(s)   Transition of Care Barriers None

## 2025-06-12 NOTE — PROGRESS NOTES
"Pharmacokinetic Initial Assessment: IV Vancomycin    Assessment/Plan:    Initiate intravenous vancomycin with loading dose of 2250 mg once followed by a maintenance dose of vancomycin 1500mg IV every 8 hours  Desired empiric serum trough concentration is 15 to 20 mcg/mL  Draw vancomycin trough on 6/13 at 0700  Pharmacy will continue to follow and monitor vancomycin.           Patient brief summary:  Jude Lejeune is a 44 y.o. male initiated on antimicrobial therapy with IV Vancomycin for treatment of suspected skin & soft tissue infection    Drug Allergies:   Review of patient's allergies indicates:  No Known Allergies    Actual Body Weight:   99.8 kg    Renal Function:   Estimated Creatinine Clearance: 139 mL/min (based on SCr of 0.83 mg/dL).,     Dialysis Method (if applicable):  N/A    CBC (last 72 hours):  Recent Labs   Lab Result Units 06/11/25  1526   WBC x10(3)/mcL 13.27*   Hgb g/dL 14.5   Hct % 42.8   Platelet x10(3)/mcL 247   Mono % % 6.9   Eos % % 0.2   Basophil % % 0.2       Metabolic Panel (last 72 hours):  Recent Labs   Lab Result Units 06/11/25  1526   Sodium mmol/L 140   Potassium mmol/L 4.0   Chloride mmol/L 100   CO2 mmol/L 32*   Glucose mg/dL 142*   Blood Urea Nitrogen mg/dL 9.2   Creatinine mg/dL 0.83   Albumin g/dL 4.0   Bilirubin Total mg/dL 0.6   ALP unit/L 94   AST unit/L 14   ALT unit/L 26       Drug levels (last 3 results):  No results for input(s): "VANCOMYCINRA", "VANCORANDOM", "VANCOMYCINPE", "VANCOPEAK", "VANCOMYCINTR", "VANCOTROUGH" in the last 72 hours.    Microbiologic Results:  Microbiology Results (last 7 days)       Procedure Component Value Units Date/Time    Blood Culture #1 **CANNOT BE ORDERED STAT** [[id number]] Collected: 06/11/25 2042    Order Status: Sent Specimen: Blood     Blood Culture #2 **CANNOT BE ORDERED STAT** [[id number]] Collected: 06/11/25 2042    Order Status: Sent Specimen: Blood     Wound Culture [[id number]] Collected: 06/11/25 2016    Order Status: Canceled " Specimen: Abscess from Face

## 2025-06-12 NOTE — OP NOTE
[facility]                       [address] [address]    PATIENT NAME:      LEJEUNE, JUDE   YOB: 1980  CSN:               [id number]  MRN:               [id number]  ADMIT DATE:        06/11/2025 14:43:00  PHYSICIAN:         Wilfred Turner MD                          OPERATIVE REPORT      DATE OF SURGERY:    06/12/2025 05:07:51    SURGEON:  Wilfred Turner MD    PREOPERATIVE DIAGNOSIS:  Facial abscess.    POSTOPERATIVE DIAGNOSIS:  Facial abscess.    PROCEDURE PERFORMED:  Incision and drainage of soft tissue abscess, facial   abscess, complicated.    FINDINGS:  Multiloculated abscess cavity in the soft tissues of the face and   chin and just below the lower lip.  Loculations noted.  Cultures taken.  Packing   placed.    ANESTHESIA:  Local.    BLOOD LOSS:  Minimal.    COMPLICATION:  None.    DRAINS:  None.    PACKING:  Iodoform dressing:  Occlusive.    DESCRIPTION OF PROCEDURE:  After informed consent was obtained, the patient's   chin was prepped and draped sterilely.  The area of concern just below the lower   lip, approximately 1 to 2 cm area of induration and tenderness was infiltrated   with 8 cc of 1% plain lidocaine.  After sufficient time was allowed for   medication to take effect, the patient was prepped and draped sterilely.  A #15   blade was then used to make a 2 cm transverse incision over the area of most   induration just below the lower lip in the middle of the chin.  The subcutaneous   tissues were then dissected and abscess cavity appeared to be multiloculated   approximately 1 to 2 cm in size.  A hemostat was then used to break up the   multiple loculations of the complicated complex abscess cavity.  Cultures were   taken.  Bleeding was minimal and controlled with pressure.  1/4-inch packing was   then placed without any complications.  After the wound was packed, an   occlusive  dressing was placed.  The patient was then cleansed.  No   complications.        ______________________________  MD YAMINI Moody/XU  DD:  06/12/2025  Time:  05:52PM  DT:  06/12/2025  Time:  06:45PM  Job #:  [id number]      OPERATIVE REPORT

## 2025-06-12 NOTE — H&P
[facility]  Hospital Medicine History & Physical Examination       Patient Name: Jude Lejeune  MRN: [id number]  Patient Class: IP- Inpatient   Admission Date: 6/11/2025   Admitting Physician: TUTU Service   Length of Stay: 5  Attending Physician: Rajesh Bell MD  Primary Care Provider: Idris Jack MD  Face-to-Face encounter date: 06/12/2025  Code Status:Full  Chief Complaint: Abscess (Patient arrives POV, ambulatory in triage. Reports was seen over the weekend for abscess on chin; sent home with Bactrim and face ointment. Symptoms have worsened and swelling to face has increased. Patient is afebrile in triage. VSS. NAD.)      Screening for Social Drivers for health:  Patient screened for food insecurity, housing instability, transportation needs, utility difficulties, and interpersonal safety (select all that apply as identified as concern)  []Housing or Food  []Transportation Needs  []Utility Difficulties  []Interpersonal safety  [x]None      Patient information was obtained from patient, patient's family, past medical records and ER records.  ED records were reviewed in detail and documented below    HISTORY OF PRESENT ILLNESS:   Jude Lejeune is a 44 y.o. male who  has a past medical history of Renal stones.. The patient presented to [facility] on 6/11/2025 with a primary complaint of redness to face (chin), pain and swelling ongoing x1 month.  Symptoms progressively worsened.  He has a known facial abscess and was placed on Keflex and Bactrim over the past month with no improvement.  Symptoms progressively worsened  which prompted an ED eval.  CT maxillofacial with contrast revealing abundant soft tissue inflammation consistent with cellulitis and submental small fluid collection, suspicious for abscess.  Patient was started on vancomycin and clindamycin.  ENT consulted.  Admitted to hospital medicine services.      PAST MEDICAL HISTORY:     Past Medical History:   Diagnosis Date     Renal stones        PAST SURGICAL HISTORY:     Past Surgical History:   Procedure Laterality Date    KIDNEY STONE SURGERY         ALLERGIES:   Patient has no known allergies.    FAMILY HISTORY:   Reviewed and negative    SOCIAL HISTORY:     Social History     Tobacco Use    Smoking status: Former     Types: Cigarettes     Start date: 01/2025     Quit date: 2015     Years since quitting: 10.4     Passive exposure: Past    Smokeless tobacco: Never   Substance Use Topics    Alcohol use: Not Currently        HOME MEDICATIONS:     Prior to Admission medications    Medication Sig Start Date End Date Taking? Authorizing Provider   mupirocin (BACTROBAN) 2 % ointment Apply topically 2 (two) times daily. for 7 days 6/7/25 6/14/25  Rubin Decker NP       REVIEW OF SYSTEMS:   Except as documented, all other systems reviewed and negative     PHYSICAL EXAM:     VITAL SIGNS: 24 HRS MIN & MAX LAST   Temp  Min: 98.1 °F (36.7 °C)  Max: 98.2 °F (36.8 °C) 98.2 °F (36.8 °C)   BP  Min: 121/79  Max: 160/100 (!) 147/85   Pulse  Min: 74  Max: 110  74   Resp  Min: 14  Max: 20 16   SpO2  Min: 97 %  Max: 99 % 98 %     General appearance: Well-developed, well-nourished male in no apparent distress.  HENT: Atraumatic head. Moist mucous membranes of oral cavity.  Redness and swelling noted to chin.  Skin intact.  Eyes: Normal extraocular movements.   Neck: Supple.   Lungs: Clear to auscultation bilaterally. No wheezing present.   Heart: Regular rate and rhythm. S1 and S2 present with no murmurs/gallop/rub. No pedal edema. No JVD present.   Abdomen: Soft, non-distended, non-tender. No rebound tenderness/guarding. Bowel sounds are normal.   Extremities: No cyanosis, clubbing, or edema.  Skin:  Redness and swelling noted to chin.  Skin intact.  Neuro: Motor and sensory exams grossly intact. Good tone. Muscle strength 5/5 in all 4 extremities  Psych/mental status: Appropriate mood and affect. Responds appropriately to questions.     LABS AND  IMAGING:     Recent Labs   Lab 06/07/25  1747 06/11/25  1526   WBC 8.32 13.27*   RBC 4.48* 4.77   HGB 13.7* 14.5   HCT 39.8* 42.8   MCV 88.8 89.7   MCH 30.6 30.4   MCHC 34.4 33.9   RDW 12.2 12.3    247   MPV 8.9 8.8       Recent Labs   Lab 06/07/25  1747 06/11/25  1526    140   K 4.0 4.0    100   CO2 25 32*   BUN 15.4 9.2   CREATININE 0.94 0.83   * 142*   CALCIUM 8.9 9.6   ALBUMIN 3.9 4.0   PROT 7.7 8.1   ALKPHOS 88 94   ALT 44 26   AST 20 14   BILITOT 0.4 0.6       Microbiology Results (last 7 days)       Procedure Component Value Units Date/Time    Blood Culture #1 **CANNOT BE ORDERED STAT** [[id number]] Collected: 06/11/25 2042    Order Status: Sent Specimen: Blood     Blood Culture #2 **CANNOT BE ORDERED STAT** [[id number]] Collected: 06/11/25 2042    Order Status: Sent Specimen: Blood     Wound Culture [[id number]] Collected: 06/11/25 2016    Order Status: Canceled Specimen: Abscess from Face              CT Maxillofacial With Contrast  Narrative: EXAMINATION:  CT MAXILLOFACIAL WITH CONTRAST    CLINICAL HISTORY:  Maxillary/facial abscess;    TECHNIQUE:  Multidetector axial performed maxillofacial following intravenous administration of contrast images are reconstructed    Dose length product was 1106 mGycm. Automated radiation control was utilized to minimize radiation dose.    COMPARISON:  None available.    FINDINGS:  There is abundant soft tissue inflammation about the chin which from the midline proceeds both right laterally and left laterally.  There is submental fluid collection measuring 1.8 cm suggestive of an abscess.  There are no adjacent mandible lytic erosive changes to suggest osteomyelitis.    There are no periorbital inflammations.  No postseptal orbital inflammation or abnormal enhancement.  Globes are intact.  No thickening inflammation of the ocular muscles.    The oropharynx, hypopharynx and the larynx is unremarkable.  There is no thickening or inflammation of the  epiglottis.  Salivary and the parotid glands appear to be unremarkable.  Right thyroid gland is larger compared left is suspected nodule.  Please further assess with ultrasound exam.    No maxillofacial acute fracture deformity identified.  There is left maxillary sinus small retention cyst.  Bilateral nasal passageways are narrowed due to hypertrophic rhinitis which is more pronounced on the right.  Nasal septum deviates to the left.  There is right middle turbinate josh bullosa.  Impression: 1. Abundant soft tissue inflammation consistent with cellulitis and submental small fluid collection suspected for an abscess.  No adjacent mandible erosive changes or osteomyelitis.    2. Details of other findings above    Electronically signed by: Carlos Iglesias  Date:    06/11/2025  Time:    19:41      ASSESSMENT & PLAN:   Submandibular abscess and cellulitis    Plan  Clindamycin 600 mg IV q.8  Vancomycin, pharmacy to dose  Pain control  Consult ENT  A.m. labs, CBC, CMP, Mag, UDS        VTE Prophylaxis: will be placed on Lovenox  for DVT prophylaxis and will be advised to be as mobile as possible and sit in a chair as tolerated    Patient condition:  Stable    __________________________________________________________________________  INPATIENT LIST OF MEDICATIONS     Scheduled Meds:   clindamycin IV (PEDS and ADULTS)  600 mg Intravenous Q8H    enoxparin  40 mg Subcutaneous Daily    vancomycin (VANCOCIN) 1,000 mg in D5W 250 mL IVPB (admixture device)  1,000 mg Intravenous Once    vancomycin 1,500 mg in D5W 250 mL IVPB (admixture device)  1,500 mg Intravenous Q8H     Continuous Infusions:  PRN Meds:.  Current Facility-Administered Medications:     acetaminophen, 1,000 mg, Oral, Q6H PRN    aluminum-magnesium hydroxide-simethicone, 30 mL, Oral, QID PRN    bisacodyL, 10 mg, Rectal, Daily PRN    dextrose 50%, 12.5 g, Intravenous, PRN    dextrose 50%, 25 g, Intravenous, PRN    glucagon (human recombinant), 1 mg, Intramuscular,  PRN    glucose, 16 g, Oral, PRN    glucose, 24 g, Oral, PRN    melatonin, 6 mg, Oral, Nightly PRN    naloxone, 0.02 mg, Intravenous, PRN    ondansetron, 4 mg, Intravenous, Q4H PRN    oxyCODONE-acetaminophen, 1 tablet, Oral, Q6H PRN    prochlorperazine, 5 mg, Intravenous, Q6H PRN    senna-docusate, 1 tablet, Oral, BID PRN    sodium chloride 0.9%, 10 mL, Intravenous, PRN    vancomycin - pharmacy to dose, , Intravenous, pharmacy to manage frequency      I, Ozzy Aleman NP  have reviewed and discussed the case with Dr.Julio Bell _   Please see the following addendum for further assessment and plan from there attending MD.    Discharge Planning and Disposition: No mobility needs. Ambulating well. Good social support system.   Anticipated discharge    If patient was admitted under observational status it is with my approval/permission.        All diagnosis and differential diagnosis have been reviewed; assessment and plan has been documented; I have personally reviewed the labs and test results that are presently available; I have reviewed the patients medication list; I have reviewed the consulting providers response and recommendations. I have reviewed or attempted to review medical records based upon their availability.    All of the patient and family questions have been addressed and answered. Patient's is agreeable to the above stated plan. I will continue to monitor closely and make adjustments to medical management as needed.    If patient was admitted under observational status it is with my approval/permission.      Ozzy Aleman NP   06/12/2025 6-12-25 dr bell -Agree with assessment and plans done by AQUILES Aleman . Continue present antibiotic management / panculture / consult ENT for I and D.   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

## 2025-06-12 NOTE — CONSULTS
[facility]                       [address] [address]    PATIENT NAME:       LEJEUNE, JUDE   YOB: 1980  CSN:                [id number]   MRN:                [id number]  ADMIT DATE:         06/11/2025 14:43:00  PHYSICIAN:          Wilfred Turner MD                            CONSULTATION    DATE OF CONSULT:  06/12/2025 04:50:29    CHIEF COMPLAINT:  Swelling.    HISTORY OF PRESENT ILLNESS:  Case reviewed.  Events noted.  A 44-year-old male,   who presented to the emergency department on 06/11/2025 with 1 month history of   facial swelling, mainly around the chin area.  He states that he has had   multiple infections over the past several years and believes he has had staph in   the past.  He was admitted on intravenous vancomycin and clindamycin.  CT scan   revealed possible 1.8 cm abscess with surrounding tissue inflammation and edema.    ENT is consulted.    PHYSICAL EXAMINATION:  VITAL SIGNS:  Afebrile.  Vital signs stable.  GENERAL.  No distress.  Follows commands.  ENT:  Significant edema and swelling of the chin just below the lower lip   extending into the submental region.  A small 1 cm area of fluctuance and most   tenderness just below the lower lip and chin area.    LABS AND STUDIES:  Reviewed.    ASSESSMENT AND PLAN:  A 44-year-old male with facial and chin cellulitis.    Probably staph with small abscess.  Abscess was drained at the bedside without   any complications.  See operative note for details.  Cultures were taken.    Continue intravenous vancomycin and clindamycin.  We will follow.        ______________________________  Wilfred Turner MD    JWA/AQS  DD:  06/12/2025  Time:  05:47PM  DT:  06/12/2025  Time:  06:34PM  Job #:  [id number]      CONSULTATION

## 2025-06-13 LAB
ANION GAP SERPL CALC-SCNC: 9 MEQ/L
BASOPHILS # BLD AUTO: 0.04 X10(3)/MCL
BASOPHILS NFR BLD AUTO: 0.4 %
BUN SERPL-MCNC: 6.1 MG/DL (ref 8.9–20.6)
CALCIUM SERPL-MCNC: 9 MG/DL (ref 8.4–10.2)
CHLORIDE SERPL-SCNC: 100 MMOL/L (ref 98–107)
CO2 SERPL-SCNC: 28 MMOL/L (ref 22–29)
CREAT SERPL-MCNC: 0.69 MG/DL (ref 0.72–1.25)
CREAT/UREA NIT SERPL: 9
EOSINOPHIL # BLD AUTO: 0.07 X10(3)/MCL (ref 0–0.9)
EOSINOPHIL NFR BLD AUTO: 0.6 %
ERYTHROCYTE [DISTWIDTH] IN BLOOD BY AUTOMATED COUNT: 12 % (ref 11.5–17)
GFR SERPLBLD CREATININE-BSD FMLA CKD-EPI: >60 ML/MIN/1.73/M2
GLUCOSE SERPL-MCNC: 119 MG/DL (ref 74–100)
HCT VFR BLD AUTO: 41.8 % (ref 42–52)
HGB BLD-MCNC: 14.3 G/DL (ref 14–18)
IMM GRANULOCYTES # BLD AUTO: 0.04 X10(3)/MCL (ref 0–0.04)
IMM GRANULOCYTES NFR BLD AUTO: 0.4 %
LYMPHOCYTES # BLD AUTO: 1.1 X10(3)/MCL (ref 0.6–4.6)
LYMPHOCYTES NFR BLD AUTO: 10.2 %
MAGNESIUM SERPL-MCNC: 1.9 MG/DL (ref 1.6–2.6)
MCH RBC QN AUTO: 30.6 PG (ref 27–31)
MCHC RBC AUTO-ENTMCNC: 34.2 G/DL (ref 33–36)
MCV RBC AUTO: 89.5 FL (ref 80–94)
MONOCYTES # BLD AUTO: 1.21 X10(3)/MCL (ref 0.1–1.3)
MONOCYTES NFR BLD AUTO: 11.2 %
NEUTROPHILS # BLD AUTO: 8.36 X10(3)/MCL (ref 2.1–9.2)
NEUTROPHILS NFR BLD AUTO: 77.2 %
NRBC BLD AUTO-RTO: 0 %
PLATELET # BLD AUTO: 251 X10(3)/MCL (ref 130–400)
PMV BLD AUTO: 9.3 FL (ref 7.4–10.4)
POTASSIUM SERPL-SCNC: 3.7 MMOL/L (ref 3.5–5.1)
RBC # BLD AUTO: 4.67 X10(6)/MCL (ref 4.7–6.1)
SODIUM SERPL-SCNC: 137 MMOL/L (ref 136–145)
VANCOMYCIN TROUGH SERPL-MCNC: 16.9 UG/ML (ref 15–20)
WBC # BLD AUTO: 10.82 X10(3)/MCL (ref 4.5–11.5)

## 2025-06-13 PROCEDURE — 83735 ASSAY OF MAGNESIUM: CPT | Performed by: INTERNAL MEDICINE

## 2025-06-13 PROCEDURE — 80202 ASSAY OF VANCOMYCIN: CPT | Performed by: INTERNAL MEDICINE

## 2025-06-13 PROCEDURE — 11000001 HC ACUTE MED/SURG PRIVATE ROOM

## 2025-06-13 PROCEDURE — 36415 COLL VENOUS BLD VENIPUNCTURE: CPT | Performed by: INTERNAL MEDICINE

## 2025-06-13 PROCEDURE — 63600175 PHARM REV CODE 636 W HCPCS: Performed by: INTERNAL MEDICINE

## 2025-06-13 PROCEDURE — 85025 COMPLETE CBC W/AUTO DIFF WBC: CPT | Performed by: INTERNAL MEDICINE

## 2025-06-13 PROCEDURE — 27000207 HC ISOLATION

## 2025-06-13 PROCEDURE — 63600175 PHARM REV CODE 636 W HCPCS: Performed by: NURSE PRACTITIONER

## 2025-06-13 PROCEDURE — 80048 BASIC METABOLIC PNL TOTAL CA: CPT | Performed by: INTERNAL MEDICINE

## 2025-06-13 PROCEDURE — 25000003 PHARM REV CODE 250: Performed by: INTERNAL MEDICINE

## 2025-06-13 RX ADMIN — CLINDAMYCIN PHOSPHATE 600 MG: 600 INJECTION, SOLUTION INTRAVENOUS at 01:06

## 2025-06-13 RX ADMIN — HYDROMORPHONE HYDROCHLORIDE 0.5 MG: 2 INJECTION INTRAMUSCULAR; INTRAVENOUS; SUBCUTANEOUS at 04:06

## 2025-06-13 RX ADMIN — VANCOMYCIN HYDROCHLORIDE 1500 MG: 1.5 INJECTION, POWDER, LYOPHILIZED, FOR SOLUTION INTRAVENOUS at 12:06

## 2025-06-13 RX ADMIN — CLINDAMYCIN PHOSPHATE 600 MG: 600 INJECTION, SOLUTION INTRAVENOUS at 04:06

## 2025-06-13 RX ADMIN — VANCOMYCIN HYDROCHLORIDE 1500 MG: 1.5 INJECTION, POWDER, LYOPHILIZED, FOR SOLUTION INTRAVENOUS at 04:06

## 2025-06-13 RX ADMIN — OXYCODONE AND ACETAMINOPHEN 1 TABLET: 325; 10 TABLET ORAL at 11:06

## 2025-06-13 RX ADMIN — VANCOMYCIN HYDROCHLORIDE 1500 MG: 1.5 INJECTION, POWDER, LYOPHILIZED, FOR SOLUTION INTRAVENOUS at 09:06

## 2025-06-13 RX ADMIN — LOSARTAN POTASSIUM 25 MG: 25 TABLET, FILM COATED ORAL at 09:06

## 2025-06-13 RX ADMIN — OXYCODONE AND ACETAMINOPHEN 1 TABLET: 325; 10 TABLET ORAL at 02:06

## 2025-06-13 RX ADMIN — ENOXAPARIN SODIUM 40 MG: 40 INJECTION SUBCUTANEOUS at 04:06

## 2025-06-13 RX ADMIN — CLINDAMYCIN PHOSPHATE 600 MG: 600 INJECTION, SOLUTION INTRAVENOUS at 08:06

## 2025-06-13 RX ADMIN — OXYCODONE AND ACETAMINOPHEN 1 TABLET: 325; 10 TABLET ORAL at 06:06

## 2025-06-13 NOTE — PROGRESS NOTES
Pharmacokinetic Assessment Follow Up: IV Vancomycin    Vancomycin serum concentration assessment(s):    The trough level was drawn correctly and can be used to guide therapy at this time. The measurement is within the desired definitive target range of 15 to 20 mcg/mL.    Vancomycin Regimen Plan:    Continue regimen to Vancomycin 1500 mg IV every 8 hours with next serum trough concentration measured at 0700 prior to 0800 dose on 06/14    Scheduled Administration Times    06/13 0800, 1600  06/14 0000, 0800    Drug levels (last 3 results):  Recent Labs   Lab Result Units 06/13/25  0642   Vancomycin Trough ug/ml 16.9       Vancomycin Administrations:  vancomycin given in the last 96 hours                     vancomycin 1,500 mg in D5W 250 mL IVPB (admixture device) (mg) 1,500 mg New Bag 06/13/25 0029     1,500 mg New Bag 06/12/25 1621     1,500 mg New Bag  0926    vancomycin (VANCOCIN) 1,000 mg in D5W 250 mL IVPB (admixture device) (mg) 1,000 mg New Bag 06/11/25 2354    vancomycin 1,250 mg in D5W 250 mL IVPB (admixture device) (mg) 1,250 mg New Bag 06/11/25 2157                    Pharmacy will continue to follow and monitor vancomycin.    Please contact pharmacy at extension [phone] for questions regarding this assessment.    Thank you for the consult,   Myke Burnett       Patient brief summary:  Jude Lejeune is a 44 y.o. male initiated on antimicrobial therapy with IV Vancomycin for treatment of skin & soft tissue infection    The patient's current regimen is 1500mg Q8    Drug Allergies:   Review of patient's allergies indicates:  No Known Allergies    Actual Body Weight:  Wt Readings from Last 1 Encounters:   06/11/25 103.3 kg (227 lb 11.8 oz)       Renal Function:   Estimated Creatinine Clearance: 177.8 mL/min (A) (based on SCr of 0.69 mg/dL (L)).,     Dialysis Method (if applicable):  N/A    CBC (last 72 hours):  Recent Labs   Lab Result Units 06/11/25  1526 06/12/25  0411 06/13/25  0508   WBC x10(3)/mcL 13.27*  12.19* 10.82   Hgb g/dL 14.5 14.1 14.3   Hct % 42.8 41.4* 41.8*   Platelet x10(3)/mcL 247 246 251   Mono % % 6.9 9.4 11.2   Eos % % 0.2 0.8 0.6   Basophil % % 0.2 0.3 0.4       Metabolic Panel (last 72 hours):  Recent Labs   Lab Result Units 06/11/25  1526 06/12/25  0411 06/13/25  0508   Sodium mmol/L 140 138 137   Potassium mmol/L 4.0 3.5 3.7   Chloride mmol/L 100 99 100   CO2 mmol/L 32* 30* 28   Glucose mg/dL 142* 119* 119*   Blood Urea Nitrogen mg/dL 9.2 6.5* 6.1*   Creatinine mg/dL 0.83 0.69* 0.69*   Albumin g/dL 4.0 3.6  --    Bilirubin Total mg/dL 0.6 0.7  --    ALP unit/L 94 88  --    AST unit/L 14 13  --    ALT unit/L 26 20  --    Magnesium Level mg/dL  --  2.00 1.90       Microbiologic Results:  Microbiology Results (last 7 days)       Procedure Component Value Units Date/Time    Blood Culture #1 **CANNOT BE ORDERED STAT** [[id number]]  (Normal) Collected: 06/11/25 2042    Order Status: Completed Specimen: Blood Updated: 06/13/25 0103     Blood Culture No Growth At 24 Hours    Blood Culture #2 **CANNOT BE ORDERED STAT** [[id number]]  (Normal) Collected: 06/11/25 2042    Order Status: Completed Specimen: Blood Updated: 06/13/25 0103     Blood Culture No Growth At 24 Hours    Anaerobic Culture [[id number]] Collected: 06/12/25 2001    Order Status: Sent Specimen: Aspirate from Chin Updated: 06/12/25 2001    Wound Culture [[id number]] Collected: 06/12/25 2001    Order Status: Sent Specimen: Abscess from Chin Updated: 06/12/25 2001    Anaerobic Culture [[id number]]     Order Status: Sent Specimen: Aspirate from Chin     Wound Culture [[id number]] Collected: 06/11/25 2016    Order Status: Canceled Specimen: Abscess from Face

## 2025-06-13 NOTE — PROGRESS NOTES
[facility]                       [address] [address]    PATIENT NAME:       LEJEUNE, JUDE   YOB: 1980  CSN:                [id number]   MRN:                [id number]  ADMIT DATE:         06/11/2025 14:43:00  PHYSICIAN:          Wilfred Turner MD                            PROGRESS NOTE    DATE:  06/13/2025 00:00:00    SUBJECTIVE:  No acute events overnight.  Patient feels better.    OBJECTIVE:  VITAL SIGNS:  Stable.  Afebrile.  GENERAL:  No distress.  Follows commands.  ENT:  Lower facial and chin abscess and cellulitis improved.  Purulent drainage   on the dressing and packing.  Decreased edema, erythema and tenderness of the   chin and submental area.  Improving.    ASSESSMENT AND PLAN:  Status post incision and drainage of facial abscess.    Cultures pending.  Probably Staph.  Continue current intravenous antibiotics.    The patient continues to improve.  Recommend daily iodoform packing changes.    Please call with any questions.        ______________________________  Wilfred Turner MD    JWA/AQS  DD:  06/13/2025  Time:  01:32PM  DT:  06/13/2025  Time:  03:17PM  Job #:  [id number]      PROGRESS NOTE

## 2025-06-14 LAB
BASOPHILS # BLD AUTO: 0.04 X10(3)/MCL
BASOPHILS NFR BLD AUTO: 0.7 %
EOSINOPHIL # BLD AUTO: 0.24 X10(3)/MCL (ref 0–0.9)
EOSINOPHIL NFR BLD AUTO: 4.3 %
ERYTHROCYTE [DISTWIDTH] IN BLOOD BY AUTOMATED COUNT: 12.1 % (ref 11.5–17)
HCT VFR BLD AUTO: 40.3 % (ref 42–52)
HGB BLD-MCNC: 13.8 G/DL (ref 14–18)
IMM GRANULOCYTES # BLD AUTO: 0.03 X10(3)/MCL (ref 0–0.04)
IMM GRANULOCYTES NFR BLD AUTO: 0.5 %
LYMPHOCYTES # BLD AUTO: 1.35 X10(3)/MCL (ref 0.6–4.6)
LYMPHOCYTES NFR BLD AUTO: 24 %
MCH RBC QN AUTO: 30.2 PG (ref 27–31)
MCHC RBC AUTO-ENTMCNC: 34.2 G/DL (ref 33–36)
MCV RBC AUTO: 88.2 FL (ref 80–94)
MONOCYTES # BLD AUTO: 0.71 X10(3)/MCL (ref 0.1–1.3)
MONOCYTES NFR BLD AUTO: 12.6 %
NEUTROPHILS # BLD AUTO: 3.26 X10(3)/MCL (ref 2.1–9.2)
NEUTROPHILS NFR BLD AUTO: 57.9 %
NRBC BLD AUTO-RTO: 0 %
PLATELET # BLD AUTO: 247 X10(3)/MCL (ref 130–400)
PMV BLD AUTO: 8.9 FL (ref 7.4–10.4)
RBC # BLD AUTO: 4.57 X10(6)/MCL (ref 4.7–6.1)
VANCOMYCIN TROUGH SERPL-MCNC: 16.6 UG/ML (ref 15–20)
WBC # BLD AUTO: 5.63 X10(3)/MCL (ref 4.5–11.5)

## 2025-06-14 PROCEDURE — 11000001 HC ACUTE MED/SURG PRIVATE ROOM

## 2025-06-14 PROCEDURE — 63600175 PHARM REV CODE 636 W HCPCS: Performed by: INTERNAL MEDICINE

## 2025-06-14 PROCEDURE — 80202 ASSAY OF VANCOMYCIN: CPT | Performed by: INTERNAL MEDICINE

## 2025-06-14 PROCEDURE — 27000207 HC ISOLATION

## 2025-06-14 PROCEDURE — 85025 COMPLETE CBC W/AUTO DIFF WBC: CPT | Performed by: INTERNAL MEDICINE

## 2025-06-14 PROCEDURE — 36415 COLL VENOUS BLD VENIPUNCTURE: CPT | Performed by: INTERNAL MEDICINE

## 2025-06-14 PROCEDURE — 25000003 PHARM REV CODE 250: Performed by: INTERNAL MEDICINE

## 2025-06-14 PROCEDURE — 25000003 PHARM REV CODE 250: Performed by: NURSE PRACTITIONER

## 2025-06-14 PROCEDURE — 63600175 PHARM REV CODE 636 W HCPCS: Performed by: NURSE PRACTITIONER

## 2025-06-14 RX ADMIN — VANCOMYCIN HYDROCHLORIDE 1500 MG: 1.5 INJECTION, POWDER, LYOPHILIZED, FOR SOLUTION INTRAVENOUS at 08:06

## 2025-06-14 RX ADMIN — HYDROMORPHONE HYDROCHLORIDE 0.5 MG: 2 INJECTION INTRAMUSCULAR; INTRAVENOUS; SUBCUTANEOUS at 06:06

## 2025-06-14 RX ADMIN — CLINDAMYCIN PHOSPHATE 600 MG: 600 INJECTION, SOLUTION INTRAVENOUS at 06:06

## 2025-06-14 RX ADMIN — OXYCODONE AND ACETAMINOPHEN 1 TABLET: 325; 10 TABLET ORAL at 01:06

## 2025-06-14 RX ADMIN — OXYCODONE AND ACETAMINOPHEN 1 TABLET: 325; 10 TABLET ORAL at 06:06

## 2025-06-14 RX ADMIN — ENOXAPARIN SODIUM 40 MG: 40 INJECTION SUBCUTANEOUS at 02:06

## 2025-06-14 RX ADMIN — OXYCODONE AND ACETAMINOPHEN 1 TABLET: 325; 10 TABLET ORAL at 10:06

## 2025-06-14 RX ADMIN — VANCOMYCIN HYDROCHLORIDE 1500 MG: 1.5 INJECTION, POWDER, LYOPHILIZED, FOR SOLUTION INTRAVENOUS at 12:06

## 2025-06-14 RX ADMIN — CLINDAMYCIN PHOSPHATE 600 MG: 600 INJECTION, SOLUTION INTRAVENOUS at 12:06

## 2025-06-14 RX ADMIN — LOSARTAN POTASSIUM 25 MG: 25 TABLET, FILM COATED ORAL at 10:06

## 2025-06-14 RX ADMIN — CLINDAMYCIN PHOSPHATE 600 MG: 600 INJECTION, SOLUTION INTRAVENOUS at 08:06

## 2025-06-14 RX ADMIN — Medication 6 MG: at 08:06

## 2025-06-14 NOTE — PROGRESS NOTES
[facility] Hospital Medicine Progress Note        Chief Complaint: Inpatient Follow-up for Abscess (Patient arrives POV, ambulatory in triage. Reports was seen over the weekend for abscess on chin; sent home with Bactrim and face ointment. Symptoms have worsened and swelling to face has increased. Patient is afebrile in triage. VSS. NAD.)    HPI: Jude Lejeune is a 44 y.o. male who  has a past medical history of Renal stones.. The patient presented to [facility] on 6/11/2025 with a primary complaint of redness to face (chin), pain and swelling ongoing x1 month.  Symptoms progressively worsened.  He has a known facial abscess and was placed on Keflex and Bactrim over the past month with no improvement.  Symptoms progressively worsened  which prompted an ED eval.  CT maxillofacial with contrast revealing abundant soft tissue inflammation consistent with cellulitis and submental small fluid collection, suspicious for abscess.  Patient was started on vancomycin and clindamycin.  ENT consulted.  Admitted to hospital medicine services.    Interval Hx:     06/13/2025 Dr. Bell-packing removed by ENT/area inspected with improvement.  Patient also refers much improvement in discomfort/pain.  Continue vancomycin.  Cultures at 12:00 p.m. still negative.  History of MRSA    Case was discussed with patient's nurse and  on the floor.    Objective/physical exam:  General appearance: Well-developed, well-nourished male in no apparent distress.  HENT: Atraumatic head. Moist mucous membranes of oral cavity.  Redness and swelling noted to chin improved, area well packed by ENT.    Eyes: Normal extraocular movements.   Neck: Supple.   Lungs: Clear to auscultation bilaterally. No wheezing present.   Heart: Regular rate and rhythm. S1 and S2 present with no murmurs/gallop/rub. No pedal edema. No JVD present.   Abdomen: Soft, non-distended, non-tender. No rebound tenderness/guarding. Bowel sounds are  normal.   Extremities: No cyanosis, clubbing, or edema.  Skin:  Redness and swelling noted to chin.  Skin intact.  Neuro: Motor and sensory exams grossly intact. Good tone. Muscle strength 5/5 in all 4 extremities  Psych/mental status: Appropriate mood and affect. Responds appropriately to questions.     VITAL SIGNS: 24 HRS MIN & MAX LAST   Temp  Min: 97.3 °F (36.3 °C)  Max: 100.3 °F (37.9 °C) 98.1 °F (36.7 °C)   BP  Min: 118/66  Max: 154/86 118/66   Pulse  Min: 75  Max: 95  75   Resp  Min: 18  Max: 20 20   SpO2  Min: 96 %  Max: 99 % 98 %     I have reviewed the following labs:  Recent Labs   Lab 06/11/25  1526 06/12/25  0411 06/13/25  0508   WBC 13.27* 12.19* 10.82   RBC 4.77 4.67* 4.67*   HGB 14.5 14.1 14.3   HCT 42.8 41.4* 41.8*   MCV 89.7 88.7 89.5   MCH 30.4 30.2 30.6   MCHC 33.9 34.1 34.2   RDW 12.3 12.2 12.0    246 251   MPV 8.8 9.0 9.3     Recent Labs   Lab 06/07/25  1747 06/11/25  1526 06/12/25 0411 06/13/25  0508    140 138 137   K 4.0 4.0 3.5 3.7    100 99 100   CO2 25 32* 30* 28   BUN 15.4 9.2 6.5* 6.1*   CREATININE 0.94 0.83 0.69* 0.69*   * 142* 119* 119*   CALCIUM 8.9 9.6 9.3 9.0   MG  --   --  2.00 1.90   ALBUMIN 3.9 4.0 3.6  --    PROT 7.7 8.1 7.5  --    ALKPHOS 88 94 88  --    ALT 44 26 20  --    AST 20 14 13  --    BILITOT 0.4 0.6 0.7  --      Microbiology Results (last 7 days)       Procedure Component Value Units Date/Time    Blood Culture #1 **CANNOT BE ORDERED STAT** [[id number]]  (Normal) Collected: 06/11/25 2042    Order Status: Completed Specimen: Blood Updated: 06/13/25 0103     Blood Culture No Growth At 24 Hours    Blood Culture #2 **CANNOT BE ORDERED STAT** [[id number]]  (Normal) Collected: 06/11/25 2042    Order Status: Completed Specimen: Blood Updated: 06/13/25 0103     Blood Culture No Growth At 24 Hours    Anaerobic Culture [[id number]] Collected: 06/12/25 2001    Order Status: Sent Specimen: Aspirate from Chin Updated: 06/12/25 2001    Wound Culture  [[id number]] Collected: 06/12/25 2001    Order Status: Sent Specimen: Abscess from Chin Updated: 06/12/25 2001    Anaerobic Culture [[id number]]     Order Status: Canceled Specimen: Aspirate from Chin     Wound Culture [[id number]] Collected: 06/11/25 2016    Order Status: Canceled Specimen: Abscess from Face              See below for Radiology    Assessment/Plan:    Submandibular abscess and cellulitis status post I and D by ENT  MRSA carrier     Plan  Clindamycin 600 mg IV q.8  Vancomycin, pharmacy to dose  Pain control  The patient improving/cultures negative at 24 hours      VTE prophylaxis:     Patient condition:  Stable    Anticipated discharge and Disposition:   Home      All diagnosis and differential diagnosis have been reviewed; assessment and plan has been documented; I have personally reviewed the labs and test results that are presently available; I have reviewed the patients medication list; I have reviewed the consulting providers response and recommendations. I have reviewed or attempted to review medical records based upon their availability    All of the patient's questions have been  addressed and answered. Patient's is agreeable to the above stated plan. I will continue to monitor closely and make adjustments to medical management as needed.    Portions of this note dictated using EMR integrated voice recognition software, and may be subject to voice recognition errors not corrected at proofreading. Please contact writer for clarification if needed.   _____________________________________________________________________    Malnutrition Status:  Nutrition consulted. Most recent weight and BMI monitored-     Measurements:  Wt Readings from Last 1 Encounters:   06/11/25 103.3 kg (227 lb 11.8 oz)   Body mass index is 28.46 kg/m².    Patient has been screened and assessed by RD.    Malnutrition Type:  Context:    Level:      Malnutrition Characteristic Summary:       Interventions/Recommendations  (treatment strategy):        Scheduled Med:   clindamycin IV (PEDS and ADULTS)  600 mg Intravenous Q8H    enoxparin  40 mg Subcutaneous Daily    losartan  25 mg Oral Daily    vancomycin 1,500 mg in D5W 250 mL IVPB (admixture device)  1,500 mg Intravenous Q8H      Continuous Infusions:     PRN Meds:    Current Facility-Administered Medications:     acetaminophen, 1,000 mg, Oral, Q6H PRN    aluminum-magnesium hydroxide-simethicone, 30 mL, Oral, QID PRN    bisacodyL, 10 mg, Rectal, Daily PRN    dextrose 50%, 12.5 g, Intravenous, PRN    dextrose 50%, 25 g, Intravenous, PRN    glucagon (human recombinant), 1 mg, Intramuscular, PRN    glucose, 16 g, Oral, PRN    glucose, 24 g, Oral, PRN    hydrALAZINE, 10 mg, Intravenous, Q4H PRN    HYDROmorphone, 0.5 mg, Intravenous, Q4H PRN    labetalol, 10 mg, Intravenous, Q4H PRN    melatonin, 6 mg, Oral, Nightly PRN    naloxone, 0.02 mg, Intravenous, PRN    ondansetron, 4 mg, Intravenous, Q4H PRN    oxyCODONE-acetaminophen, 1 tablet, Oral, Q6H PRN    prochlorperazine, 5 mg, Intravenous, Q6H PRN    senna-docusate, 1 tablet, Oral, BID PRN    sodium chloride 0.9%, 10 mL, Intravenous, PRN    vancomycin - pharmacy to dose, , Intravenous, pharmacy to manage frequency     Radiology:  I have personally reviewed the following imaging and agree with the radiologist.     CT Maxillofacial With Contrast  Narrative: EXAMINATION:  CT MAXILLOFACIAL WITH CONTRAST    CLINICAL HISTORY:  Maxillary/facial abscess;    TECHNIQUE:  Multidetector axial performed maxillofacial following intravenous administration of contrast images are reconstructed    Dose length product was 1106 mGycm. Automated radiation control was utilized to minimize radiation dose.    COMPARISON:  None available.    FINDINGS:  There is abundant soft tissue inflammation about the chin which from the midline proceeds both right laterally and left laterally.  There is submental fluid collection measuring 1.8 cm suggestive of an abscess.   There are no adjacent mandible lytic erosive changes to suggest osteomyelitis.    There are no periorbital inflammations.  No postseptal orbital inflammation or abnormal enhancement.  Globes are intact.  No thickening inflammation of the ocular muscles.    The oropharynx, hypopharynx and the larynx is unremarkable.  There is no thickening or inflammation of the epiglottis.  Salivary and the parotid glands appear to be unremarkable.  Right thyroid gland is larger compared left is suspected nodule.  Please further assess with ultrasound exam.    No maxillofacial acute fracture deformity identified.  There is left maxillary sinus small retention cyst.  Bilateral nasal passageways are narrowed due to hypertrophic rhinitis which is more pronounced on the right.  Nasal septum deviates to the left.  There is right middle turbinate josh bullosa.  Impression: 1. Abundant soft tissue inflammation consistent with cellulitis and submental small fluid collection suspected for an abscess.  No adjacent mandible erosive changes or osteomyelitis.    2. Details of other findings above    Electronically signed by: Carlos Iglesias  Date:    06/11/2025  Time:    19:41      Rajesh Bell MD  Department of Hospital Medicine   [facility]   06/13/2025

## 2025-06-14 NOTE — PROGRESS NOTES
[facility] Hospital Medicine Progress Note        Chief Complaint: Inpatient Follow-up for Abscess (Patient arrives POV, ambulatory in triage. Reports was seen over the weekend for abscess on chin; sent home with Bactrim and face ointment. Symptoms have worsened and swelling to face has increased. Patient is afebrile in triage. VSS. NAD.)    HPI: Jude Lejeune is a 44 y.o. male who  has a past medical history of Renal stones.. The patient presented to [facility] on 6/11/2025 with a primary complaint of redness to face (chin), pain and swelling ongoing x1 month.  Symptoms progressively worsened.  He has a known facial abscess and was placed on Keflex and Bactrim over the past month with no improvement.  Symptoms progressively worsened  which prompted an ED eval.  CT maxillofacial with contrast revealing abundant soft tissue inflammation consistent with cellulitis and submental small fluid collection, suspicious for abscess.  Patient was started on vancomycin and clindamycin.  ENT consulted.  Admitted to hospital medicine services.    Interval Hx:     06/13/2025 Dr. Bell-packing removed by ENT/area inspected with improvement.  Patient also refers much improvement in discomfort/pain.  Continue vancomycin.  Cultures at 12:00 p.m. still negative.  History of MRSA    Six/14/25 Dr. Bell-no new issues.  Cultures with many Staph.  Patient on vancomycin.    Case was discussed with patient's nurse and  on the floor.    Objective/physical exam:  General appearance: Well-developed, well-nourished male in no apparent distress.  HENT: Atraumatic head. Moist mucous membranes of oral cavity.  Redness and swelling noted to chin improved, area well packed by ENT.  + areas of induration   Eyes: Normal extraocular movements.   Neck: Supple.   Lungs: Clear to auscultation bilaterally. No wheezing present.   Heart: Regular rate and rhythm. S1 and S2 present with no murmurs/gallop/rub. No pedal  edema. No JVD present.   Abdomen: Soft, non-distended, non-tender. No rebound tenderness/guarding. Bowel sounds are normal.   Extremities: No cyanosis, clubbing, or edema.  Skin:  Redness and swelling noted to chin.  Skin intact.  Neuro: Motor and sensory exams grossly intact. Good tone. Muscle strength 5/5 in all 4 extremities  Psych/mental status: Appropriate mood and affect. Responds appropriately to questions.     VITAL SIGNS: 24 HRS MIN & MAX LAST   Temp  Min: 97.6 °F (36.4 °C)  Max: 98.3 °F (36.8 °C) 98.1 °F (36.7 °C)   BP  Min: 117/70  Max: 149/96 117/70   Pulse  Min: 62  Max: 85  78   Resp  Min: 14  Max: 20 18   SpO2  Min: 95 %  Max: 99 % 97 %     I have reviewed the following labs:  Recent Labs   Lab 06/12/25  0411 06/13/25  0508 06/14/25  0442   WBC 12.19* 10.82 5.63   RBC 4.67* 4.67* 4.57*   HGB 14.1 14.3 13.8*   HCT 41.4* 41.8* 40.3*   MCV 88.7 89.5 88.2   MCH 30.2 30.6 30.2   MCHC 34.1 34.2 34.2   RDW 12.2 12.0 12.1    251 247   MPV 9.0 9.3 8.9     Recent Labs   Lab 06/07/25  1747 06/11/25  1526 06/12/25  0411 06/13/25  0508    140 138 137   K 4.0 4.0 3.5 3.7    100 99 100   CO2 25 32* 30* 28   BUN 15.4 9.2 6.5* 6.1*   CREATININE 0.94 0.83 0.69* 0.69*   * 142* 119* 119*   CALCIUM 8.9 9.6 9.3 9.0   MG  --   --  2.00 1.90   ALBUMIN 3.9 4.0 3.6  --    PROT 7.7 8.1 7.5  --    ALKPHOS 88 94 88  --    ALT 44 26 20  --    AST 20 14 13  --    BILITOT 0.4 0.6 0.7  --      Microbiology Results (last 7 days)       Procedure Component Value Units Date/Time    Wound Culture [[id number]]  (Abnormal) Collected: 06/12/25 2001    Order Status: Completed Specimen: Abscess from Chin Updated: 06/14/25 0640     Wound Culture Moderate Staphylococcus aureus    Blood Culture #1 **CANNOT BE ORDERED STAT** [[id number]]  (Normal) Collected: 06/11/25 2042    Order Status: Completed Specimen: Blood Updated: 06/14/25 0103     Blood Culture No Growth At 48 Hours    Blood Culture #2 **CANNOT BE ORDERED STAT**  [[id number]]  (Normal) Collected: 06/11/25 2042    Order Status: Completed Specimen: Blood Updated: 06/14/25 0103     Blood Culture No Growth At 48 Hours    Anaerobic Culture [[id number]] Collected: 06/12/25 2001    Order Status: Sent Specimen: Aspirate from Chin Updated: 06/12/25 2001    Anaerobic Culture [[id number]]     Order Status: Canceled Specimen: Aspirate from Chin     Wound Culture [[id number]] Collected: 06/11/25 2016    Order Status: Canceled Specimen: Abscess from Face              See below for Radiology    Assessment/Plan:    Submandibular abscess and cellulitis status post I and D by ENT  MRSA carrier  - many staph in cx's      Plan  Clindamycin 600 mg IV q.8  Vancomycin, pharmacy to dose  Pain control        VTE prophylaxis:     Patient condition:  Stable    Anticipated discharge and Disposition:   Home      All diagnosis and differential diagnosis have been reviewed; assessment and plan has been documented; I have personally reviewed the labs and test results that are presently available; I have reviewed the patients medication list; I have reviewed the consulting providers response and recommendations. I have reviewed or attempted to review medical records based upon their availability    All of the patient's questions have been  addressed and answered. Patient's is agreeable to the above stated plan. I will continue to monitor closely and make adjustments to medical management as needed.    Portions of this note dictated using EMR integrated voice recognition software, and may be subject to voice recognition errors not corrected at proofreading. Please contact writer for clarification if needed.   _____________________________________________________________________    Malnutrition Status:  Nutrition consulted. Most recent weight and BMI monitored-     Measurements:  Wt Readings from Last 1 Encounters:   06/11/25 103.3 kg (227 lb 11.8 oz)   Body mass index is 28.46 kg/m².    Patient has been  screened and assessed by RD.    Malnutrition Type:  Context:    Level:      Malnutrition Characteristic Summary:       Interventions/Recommendations (treatment strategy):        Scheduled Med:   clindamycin IV (PEDS and ADULTS)  600 mg Intravenous Q8H    enoxparin  40 mg Subcutaneous Daily    losartan  25 mg Oral Daily    vancomycin 1,500 mg in D5W 250 mL IVPB (admixture device)  1,500 mg Intravenous Q8H      Continuous Infusions:     PRN Meds:    Current Facility-Administered Medications:     acetaminophen, 1,000 mg, Oral, Q6H PRN    aluminum-magnesium hydroxide-simethicone, 30 mL, Oral, QID PRN    bisacodyL, 10 mg, Rectal, Daily PRN    dextrose 50%, 12.5 g, Intravenous, PRN    dextrose 50%, 25 g, Intravenous, PRN    glucagon (human recombinant), 1 mg, Intramuscular, PRN    glucose, 16 g, Oral, PRN    glucose, 24 g, Oral, PRN    hydrALAZINE, 10 mg, Intravenous, Q4H PRN    HYDROmorphone, 0.5 mg, Intravenous, Q4H PRN    labetalol, 10 mg, Intravenous, Q4H PRN    melatonin, 6 mg, Oral, Nightly PRN    naloxone, 0.02 mg, Intravenous, PRN    ondansetron, 4 mg, Intravenous, Q4H PRN    oxyCODONE-acetaminophen, 1 tablet, Oral, Q6H PRN    prochlorperazine, 5 mg, Intravenous, Q6H PRN    senna-docusate, 1 tablet, Oral, BID PRN    sodium chloride 0.9%, 10 mL, Intravenous, PRN    vancomycin - pharmacy to dose, , Intravenous, pharmacy to manage frequency     Radiology:  I have personally reviewed the following imaging and agree with the radiologist.     CT Maxillofacial With Contrast  Narrative: EXAMINATION:  CT MAXILLOFACIAL WITH CONTRAST    CLINICAL HISTORY:  Maxillary/facial abscess;    TECHNIQUE:  Multidetector axial performed maxillofacial following intravenous administration of contrast images are reconstructed    Dose length product was 1106 mGycm. Automated radiation control was utilized to minimize radiation dose.    COMPARISON:  None available.    FINDINGS:  There is abundant soft tissue inflammation about the chin which  from the midline proceeds both right laterally and left laterally.  There is submental fluid collection measuring 1.8 cm suggestive of an abscess.  There are no adjacent mandible lytic erosive changes to suggest osteomyelitis.    There are no periorbital inflammations.  No postseptal orbital inflammation or abnormal enhancement.  Globes are intact.  No thickening inflammation of the ocular muscles.    The oropharynx, hypopharynx and the larynx is unremarkable.  There is no thickening or inflammation of the epiglottis.  Salivary and the parotid glands appear to be unremarkable.  Right thyroid gland is larger compared left is suspected nodule.  Please further assess with ultrasound exam.    No maxillofacial acute fracture deformity identified.  There is left maxillary sinus small retention cyst.  Bilateral nasal passageways are narrowed due to hypertrophic rhinitis which is more pronounced on the right.  Nasal septum deviates to the left.  There is right middle turbinate josh bullosa.  Impression: 1. Abundant soft tissue inflammation consistent with cellulitis and submental small fluid collection suspected for an abscess.  No adjacent mandible erosive changes or osteomyelitis.    2. Details of other findings above    Electronically signed by: Carlos Iglesias  Date:    06/11/2025  Time:    19:41      Rajesh Bell MD  Department of Hospital Medicine   [facility]   06/14/2025

## 2025-06-14 NOTE — PLAN OF CARE
Problem: Adult Inpatient Plan of Care  Goal: Absence of Hospital-Acquired Illness or Injury  Outcome: Progressing     Problem: Adult Inpatient Plan of Care  Goal: Readiness for Transition of Care  Outcome: Progressing     Problem: Infection  Goal: Absence of Infection Signs and Symptoms  Outcome: Progressing     Problem: Wound  Goal: Optimal Functional Ability  Outcome: Progressing     Problem: Wound  Goal: Skin Health and Integrity  Outcome: Progressing     Problem: Wound  Goal: Optimal Wound Healing  Outcome: Progressing

## 2025-06-14 NOTE — PROGRESS NOTES
Pharmacokinetic Assessment Follow Up: IV Vancomycin    Vancomycin serum concentration assessment(s):    The trough level was drawn correctly and can be used to guide therapy at this time. The measurement is within the desired definitive target range of 15 to 20 mcg/mL.    Vancomycin Regimen Plan:    Continue regimen to Vancomycin 1500 mg IV every 8 hours with next serum trough concentration measured at 1030 prior to 1130 dose on 06/15      Drug levels (last 3 results):  Recent Labs   Lab Result Units 06/13/25  0642 06/14/25  1021   Vancomycin Trough ug/ml 16.9 16.6       Vancomycin Administrations:  vancomycin given in the last 96 hours                     vancomycin 1,500 mg in D5W 250 mL IVPB (admixture device) (mg) 1,500 mg New Bag 06/14/25 0045     1,500 mg New Bag 06/13/25 1619     1,500 mg New Bag  0950     1,500 mg New Bag  0029     1,500 mg New Bag 06/12/25 1621     1,500 mg New Bag  0926    vancomycin (VANCOCIN) 1,000 mg in D5W 250 mL IVPB (admixture device) (mg) 1,000 mg New Bag 06/11/25 2354    vancomycin 1,250 mg in D5W 250 mL IVPB (admixture device) (mg) 1,250 mg New Bag 06/11/25 2157                    Pharmacy will continue to follow and monitor vancomycin.    Please contact pharmacy at extension [phone] for questions regarding this assessment.    Thank you for the consult,   Danny Andino       Patient brief summary:  Jude Lejeune is a 44 y.o. male initiated on antimicrobial therapy with IV Vancomycin for treatment of skin & soft tissue infection    The patient's current regimen is 1.5 g q8h    Drug Allergies:   Review of patient's allergies indicates:  No Known Allergies    Actual Body Weight:  Wt Readings from Last 1 Encounters:   06/11/25 103.3 kg (227 lb 11.8 oz)       Renal Function:   Estimated Creatinine Clearance: 177.8 mL/min (A) (based on SCr of 0.69 mg/dL (L)).,     Dialysis Method (if applicable):  N/A    CBC (last 72 hours):  Recent Labs   Lab Result Units 06/11/25  1526 06/12/25  0411  06/13/25  0508 06/14/25  0442   WBC x10(3)/mcL 13.27* 12.19* 10.82 5.63   Hgb g/dL 14.5 14.1 14.3 13.8*   Hct % 42.8 41.4* 41.8* 40.3*   Platelet x10(3)/mcL 247 246 251 247   Mono % % 6.9 9.4 11.2 12.6   Eos % % 0.2 0.8 0.6 4.3   Basophil % % 0.2 0.3 0.4 0.7       Metabolic Panel (last 72 hours):  Recent Labs   Lab Result Units 06/11/25  1526 06/12/25  0411 06/13/25  0508   Sodium mmol/L 140 138 137   Potassium mmol/L 4.0 3.5 3.7   Chloride mmol/L 100 99 100   CO2 mmol/L 32* 30* 28   Glucose mg/dL 142* 119* 119*   Blood Urea Nitrogen mg/dL 9.2 6.5* 6.1*   Creatinine mg/dL 0.83 0.69* 0.69*   Albumin g/dL 4.0 3.6  --    Bilirubin Total mg/dL 0.6 0.7  --    ALP unit/L 94 88  --    AST unit/L 14 13  --    ALT unit/L 26 20  --    Magnesium Level mg/dL  --  2.00 1.90       Microbiologic Results:  Microbiology Results (last 7 days)       Procedure Component Value Units Date/Time    Wound Culture [[id number]]  (Abnormal) Collected: 06/12/25 2001    Order Status: Completed Specimen: Abscess from Chin Updated: 06/14/25 0640     Wound Culture Moderate Staphylococcus aureus    Blood Culture #1 **CANNOT BE ORDERED STAT** [[id number]]  (Normal) Collected: 06/11/25 2042    Order Status: Completed Specimen: Blood Updated: 06/14/25 0103     Blood Culture No Growth At 48 Hours    Blood Culture #2 **CANNOT BE ORDERED STAT** [[id number]]  (Normal) Collected: 06/11/25 2042    Order Status: Completed Specimen: Blood Updated: 06/14/25 0103     Blood Culture No Growth At 48 Hours    Anaerobic Culture [[id number]] Collected: 06/12/25 2001    Order Status: Sent Specimen: Aspirate from [facility] Updated: 06/12/25 2001    Anaerobic Culture [[id number]]     Order Status: Canceled Specimen: Aspirate from Chin     Wound Culture [[id number]] Collected: 06/11/25 2016    Order Status: Canceled Specimen: Abscess from Face

## 2025-06-15 LAB
ANION GAP SERPL CALC-SCNC: 6 MEQ/L
BACTERIA WND CULT: ABNORMAL
BUN SERPL-MCNC: 14.9 MG/DL (ref 8.9–20.6)
CALCIUM SERPL-MCNC: 9.2 MG/DL (ref 8.4–10.2)
CHLORIDE SERPL-SCNC: 104 MMOL/L (ref 98–107)
CO2 SERPL-SCNC: 29 MMOL/L (ref 22–29)
CREAT SERPL-MCNC: 0.79 MG/DL (ref 0.72–1.25)
CREAT/UREA NIT SERPL: 19
GFR SERPLBLD CREATININE-BSD FMLA CKD-EPI: >60 ML/MIN/1.73/M2
GLUCOSE SERPL-MCNC: 195 MG/DL (ref 74–100)
POTASSIUM SERPL-SCNC: 3.7 MMOL/L (ref 3.5–5.1)
SODIUM SERPL-SCNC: 139 MMOL/L (ref 136–145)
VANCOMYCIN TROUGH SERPL-MCNC: 18.7 UG/ML (ref 15–20)

## 2025-06-15 PROCEDURE — 25000003 PHARM REV CODE 250: Performed by: INTERNAL MEDICINE

## 2025-06-15 PROCEDURE — 27000207 HC ISOLATION

## 2025-06-15 PROCEDURE — 11000001 HC ACUTE MED/SURG PRIVATE ROOM

## 2025-06-15 PROCEDURE — 36415 COLL VENOUS BLD VENIPUNCTURE: CPT | Performed by: INTERNAL MEDICINE

## 2025-06-15 PROCEDURE — 63600175 PHARM REV CODE 636 W HCPCS: Performed by: INTERNAL MEDICINE

## 2025-06-15 PROCEDURE — 63600175 PHARM REV CODE 636 W HCPCS: Performed by: NURSE PRACTITIONER

## 2025-06-15 PROCEDURE — 80048 BASIC METABOLIC PNL TOTAL CA: CPT | Performed by: INTERNAL MEDICINE

## 2025-06-15 PROCEDURE — 80202 ASSAY OF VANCOMYCIN: CPT | Performed by: INTERNAL MEDICINE

## 2025-06-15 RX ORDER — DOXYCYCLINE HYCLATE 100 MG
100 TABLET ORAL EVERY 12 HOURS
Status: DISCONTINUED | OUTPATIENT
Start: 2025-06-15 | End: 2025-06-16 | Stop reason: HOSPADM

## 2025-06-15 RX ORDER — MUPIROCIN 20 MG/G
OINTMENT TOPICAL 2 TIMES DAILY
Status: DISCONTINUED | OUTPATIENT
Start: 2025-06-15 | End: 2025-06-16 | Stop reason: HOSPADM

## 2025-06-15 RX ADMIN — HYDROMORPHONE HYDROCHLORIDE 0.5 MG: 2 INJECTION INTRAMUSCULAR; INTRAVENOUS; SUBCUTANEOUS at 10:06

## 2025-06-15 RX ADMIN — VANCOMYCIN HYDROCHLORIDE 1500 MG: 1.5 INJECTION, POWDER, LYOPHILIZED, FOR SOLUTION INTRAVENOUS at 09:06

## 2025-06-15 RX ADMIN — VANCOMYCIN HYDROCHLORIDE 1500 MG: 1.5 INJECTION, POWDER, LYOPHILIZED, FOR SOLUTION INTRAVENOUS at 01:06

## 2025-06-15 RX ADMIN — ENOXAPARIN SODIUM 40 MG: 40 INJECTION SUBCUTANEOUS at 05:06

## 2025-06-15 RX ADMIN — OXYCODONE AND ACETAMINOPHEN 1 TABLET: 325; 10 TABLET ORAL at 05:06

## 2025-06-15 RX ADMIN — MUPIROCIN: 20 OINTMENT TOPICAL at 08:06

## 2025-06-15 RX ADMIN — DOXYCYCLINE HYCLATE 100 MG: 100 TABLET, COATED ORAL at 08:06

## 2025-06-15 RX ADMIN — CLINDAMYCIN PHOSPHATE 600 MG: 600 INJECTION, SOLUTION INTRAVENOUS at 04:06

## 2025-06-15 RX ADMIN — LOSARTAN POTASSIUM 25 MG: 25 TABLET, FILM COATED ORAL at 09:06

## 2025-06-15 RX ADMIN — VANCOMYCIN HYDROCHLORIDE 1500 MG: 1.5 INJECTION, POWDER, LYOPHILIZED, FOR SOLUTION INTRAVENOUS at 04:06

## 2025-06-15 RX ADMIN — CLINDAMYCIN PHOSPHATE 600 MG: 600 INJECTION, SOLUTION INTRAVENOUS at 02:06

## 2025-06-15 NOTE — PROGRESS NOTES
Pharmacokinetic Assessment Follow Up: IV Vancomycin    Vancomycin serum concentration assessment(s):    The trough level was drawn correctly and can be used to guide therapy at this time. The measurement is within the desired definitive target range of 15 to 20 mcg/mL.    Vancomycin Regimen Plan:    Continue regimen to Vancomycin 1500 mg IV every 8 hours with next serum trough concentration measured at 2000 prior to   dose on 06/16.    Drug levels (last 3 results):  Recent Labs   Lab Result Units 06/13/25  0642 06/14/25  1021 06/15/25  1208   Vancomycin Trough ug/ml 16.9 16.6 18.7       Pharmacy will continue to follow and monitor vancomycin.    Please contact pharmacy at extension [phone] for questions regarding this assessment.    Thank you for the consult,   Trung Marin       Patient brief summary:  Jude Lejeune is a 44 y.o. male initiated on antimicrobial therapy with IV Vancomycin for treatment of skin & soft tissue infection    The patient's current regimen is 1500mg q8h    Drug Allergies:   Review of patient's allergies indicates:  No Known Allergies    Actual Body Weight:   103.3kg    Renal Function:   Estimated Creatinine Clearance: 155.3 mL/min (based on SCr of 0.79 mg/dL).,     Dialysis Method (if applicable):  N/A    CBC (last 72 hours):  Recent Labs   Lab Result Units 06/13/25  0508 06/14/25  0442   WBC x10(3)/mcL 10.82 5.63   Hgb g/dL 14.3 13.8*   Hct % 41.8* 40.3*   Platelet x10(3)/mcL 251 247   Mono % % 11.2 12.6   Eos % % 0.6 4.3   Basophil % % 0.4 0.7       Metabolic Panel (last 72 hours):  Recent Labs   Lab Result Units 06/13/25  0508 06/15/25  0458   Sodium mmol/L 137 139   Potassium mmol/L 3.7 3.7   Chloride mmol/L 100 104   CO2 mmol/L 28 29   Glucose mg/dL 119* 195*   Blood Urea Nitrogen mg/dL 6.1* 14.9   Creatinine mg/dL 0.69* 0.79   Magnesium Level mg/dL 1.90  --        Vancomycin Administrations:  vancomycin given in the last 96 hours                     vancomycin 1,500 mg in D5W 250 mL IVPB  (admixture device) (mg) 1,500 mg New Bag 06/15/25 0414     1,500 mg New Bag 06/14/25 2020     1,500 mg New Bag  1252      Restarted  0047     1,500 mg New Bag  0045     1,500 mg New Bag 06/13/25 1619     1,500 mg New Bag  0950     1,500 mg New Bag  0029     1,500 mg New Bag 06/12/25 1621     1,500 mg New Bag  0926    vancomycin (VANCOCIN) 1,000 mg in D5W 250 mL IVPB (admixture device) (mg) 1,000 mg New Bag 06/11/25 2354    vancomycin 1,250 mg in D5W 250 mL IVPB (admixture device) (mg) 1,250 mg New Bag 06/11/25 2157                    Microbiologic Results:  Microbiology Results (last 7 days)       Procedure Component Value Units Date/Time    Anaerobic Culture [[id number]] Collected: 06/12/25 2001    Order Status: Completed Specimen: Aspirate from Chin Updated: 06/15/25 0947     Anaerobe Culture No Anaerobes Isolated    Wound Culture [[id number]]  (Abnormal)  (Susceptibility) Collected: 06/12/25 2001    Order Status: Completed Specimen: Abscess from Chin Updated: 06/15/25 0744     Wound Culture Moderate Methicillin resistant Staphylococcus aureus    Blood Culture #1 **CANNOT BE ORDERED STAT** [[id number]]  (Normal) Collected: 06/11/25 2042    Order Status: Completed Specimen: Blood Updated: 06/15/25 0100     Blood Culture No Growth At 72 Hours    Blood Culture #2 **CANNOT BE ORDERED STAT** [[id number]]  (Normal) Collected: 06/11/25 2042    Order Status: Completed Specimen: Blood Updated: 06/15/25 0100     Blood Culture No Growth At 72 Hours    Anaerobic Culture [[id number]]     Order Status: Canceled Specimen: Aspirate from Chin     Wound Culture [[id number]] Collected: 06/11/25 2016    Order Status: Canceled Specimen: Abscess from Face

## 2025-06-16 VITALS
BODY MASS INDEX: 28.32 KG/M2 | OXYGEN SATURATION: 99 % | TEMPERATURE: 98 F | DIASTOLIC BLOOD PRESSURE: 92 MMHG | HEART RATE: 77 BPM | RESPIRATION RATE: 18 BRPM | HEIGHT: 75 IN | SYSTOLIC BLOOD PRESSURE: 147 MMHG | WEIGHT: 227.75 LBS

## 2025-06-16 PROBLEM — I10 PRIMARY HYPERTENSION: Status: ACTIVE | Noted: 2025-06-16

## 2025-06-16 PROBLEM — L03.211 CELLULITIS OF SUBMENTAL SPACE: Status: RESOLVED | Noted: 2025-06-16 | Resolved: 2025-06-16

## 2025-06-16 PROBLEM — L02.01 SUBMENTAL ABSCESS: Status: ACTIVE | Noted: 2025-06-16

## 2025-06-16 PROBLEM — Z22.322 MRSA (METHICILLIN RESISTANT STAPHYLOCOCCUS AUREUS) CARRIER: Status: ACTIVE | Noted: 2025-06-16

## 2025-06-16 PROBLEM — L03.211 CELLULITIS OF SUBMENTAL SPACE: Status: ACTIVE | Noted: 2025-06-16

## 2025-06-16 LAB — BACTERIA SPEC ANAEROBE CULT: ABNORMAL

## 2025-06-16 PROCEDURE — 63600175 PHARM REV CODE 636 W HCPCS: Performed by: INTERNAL MEDICINE

## 2025-06-16 PROCEDURE — 25000003 PHARM REV CODE 250: Performed by: INTERNAL MEDICINE

## 2025-06-16 RX ORDER — MUPIROCIN 20 MG/G
OINTMENT TOPICAL 2 TIMES DAILY
Qty: 30 G | Refills: 0 | Status: SHIPPED | OUTPATIENT
Start: 2025-06-16 | End: 2025-06-23

## 2025-06-16 RX ORDER — LOSARTAN POTASSIUM 25 MG/1
25 TABLET ORAL DAILY
Qty: 90 TABLET | Refills: 3 | Status: SHIPPED | OUTPATIENT
Start: 2025-06-17 | End: 2026-06-17

## 2025-06-16 RX ORDER — DOXYCYCLINE HYCLATE 100 MG
100 TABLET ORAL EVERY 12 HOURS
Qty: 20 TABLET | Refills: 0 | Status: SHIPPED | OUTPATIENT
Start: 2025-06-16 | End: 2025-06-26

## 2025-06-16 RX ORDER — OXYCODONE AND ACETAMINOPHEN 5; 325 MG/1; MG/1
1 TABLET ORAL EVERY 6 HOURS PRN
Qty: 16 TABLET | Refills: 0 | Status: SHIPPED | OUTPATIENT
Start: 2025-06-16 | End: 2025-06-20

## 2025-06-16 RX ADMIN — VANCOMYCIN HYDROCHLORIDE 1500 MG: 1.5 INJECTION, POWDER, LYOPHILIZED, FOR SOLUTION INTRAVENOUS at 12:06

## 2025-06-16 RX ADMIN — MUPIROCIN: 20 OINTMENT TOPICAL at 09:06

## 2025-06-16 RX ADMIN — DOXYCYCLINE HYCLATE 100 MG: 100 TABLET, COATED ORAL at 09:06

## 2025-06-16 RX ADMIN — VANCOMYCIN HYDROCHLORIDE 1500 MG: 1.5 INJECTION, POWDER, LYOPHILIZED, FOR SOLUTION INTRAVENOUS at 05:06

## 2025-06-16 RX ADMIN — LOSARTAN POTASSIUM 25 MG: 25 TABLET, FILM COATED ORAL at 09:06

## 2025-06-16 NOTE — DISCHARGE INSTRUCTIONS
1.-please follow-up with your primary care physician in 1-2 weeks  2. He has been diagnosed with hypertensive vascular disease and placed on blood pressure medications.  Please rechecked blood pressure medications on a daily basis at home, keep log and follow-up with your primary care physician    3.-low-salt diet

## 2025-06-16 NOTE — NURSING
Discharge instructions given to pt, reviewed and answered all questions. Educated pt on signs and symptoms to monitor for infection, pt verbalized understanding. IV removed, NAD noted.

## 2025-06-16 NOTE — PLAN OF CARE
06/16/25 1320   Final Note   Assessment Type Discharge Planning Assessment   Anticipated Discharge Disposition Home   Hospital Resources/Appts/Education Provided Appointments scheduled and added to AVS   Post-Acute Status   Discharge Delays None known at this time

## 2025-06-16 NOTE — DISCHARGE SUMMARY
[facility]  Hospital Medicine Discharge Summary    Admit Date: 6/11/2025  Discharge Date and Time: 6/16/20251:22 PM  Admitting Physician:  Team  Discharging Physician: Rajesh Bell MD.  Primary Care Physician: No primary care provider on file.  Consults: ENT    Discharge Diagnoses:  1. -submental cellulitis with abscess formation  -status post incision and debridement by ENT   2.-new onset hypertensive vascular disease  3.-MRSA carrier     Hospital Course:   Jude Lejeune is a 44 y.o. male who  has a past medical history of Renal stones.. The patient presented to [facility] on 6/11/2025 with a primary complaint of redness to face (chin), pain and swelling ongoing x1 month.  Symptoms progressively worsened.  He has a known facial abscess and was placed on Keflex and Bactrim over the past month with no improvement.  Symptoms progressively worsened  which prompted an ED eval.  CT maxillofacial with contrast revealing abundant soft tissue inflammation consistent with cellulitis and submental small fluid collection, suspicious for abscess.  Patient was started on vancomycin and clindamycin.  ENT consulted.  Admitted to hospital medicine services.  On admission patient was placed on vancomycin/clindamycin.  Patient with a history of MRSA carrier.  ENT was consulted and patient underwent incision and drainage of abscess over submental area .  According to ENT notes, abscess was loculated.  Cultures were taken and pertinent for MRSA.  Above antibiotics were continued until erythema subsided.  There is no more drainage other than serosanguineous.  There is a small area of remaining induration.  While patient was admitted he exhibited elevated blood pressure.  The patient was placed on Cozaar 25 mg p.o. daily.  Patient will be discharged on doxycycline 100 mg p.o. b.i.d. times 10 days.  Losartan 25 mg were sent to his pharmacy as well as Percocet 5/325.   was consulted for primary care  physician.  Patient discharged after improved condition .  Patient to remove packing in 2 days.       Pt was seen and examined on the day of discharge  Vitals:  VITAL SIGNS: 24 HRS MIN & MAX LAST   Temp  Min: 97.8 °F (36.6 °C)  Max: 98.4 °F (36.9 °C) 98.3 °F (36.8 °C)   BP  Min: 111/73  Max: 147/92 (!) 147/92   Pulse  Min: 73  Max: 84  77   Resp  Min: 18  Max: 18 18   SpO2  Min: 95 %  Max: 99 % 99 %       Physical Exam:  General appearance: Well-developed, well-nourished male in no apparent distress.  HENT: Atraumatic head. Moist mucous membranes of oral cavity.  Resolved Redness and swelling  to chin improved, area well packed by ENT.  + areas of induration   Eyes: Normal extraocular movements.   Neck: Supple.   Lungs: Clear to auscultation bilaterally. No wheezing present.   Heart: Regular rate and rhythm. S1 and S2 present with no murmurs/gallop/rub. No pedal edema. No JVD present.   Abdomen: Soft, non-distended, non-tender. No rebound tenderness/guarding. Bowel sounds are normal.   Extremities: No cyanosis, clubbing, or edema.  Skin:  Redness and swelling noted to chin.  Skin intact.  Neuro: Motor and sensory exams grossly intact. Good tone. Muscle strength 5/5 in all 4 extremities  Psych/mental status: Appropriate mood and affect. Responds appropriately to questions.     Procedures Performed: No admission procedures for hospital encounter.     Significant Diagnostic Studies: See Full reports for all details    Recent Labs   Lab 06/12/25  0411 06/13/25  0508 06/14/25  0442   WBC 12.19* 10.82 5.63   RBC 4.67* 4.67* 4.57*   HGB 14.1 14.3 13.8*   HCT 41.4* 41.8* 40.3*   MCV 88.7 89.5 88.2   MCH 30.2 30.6 30.2   MCHC 34.1 34.2 34.2   RDW 12.2 12.0 12.1    251 247   MPV 9.0 9.3 8.9       Recent Labs   Lab 06/11/25  1526 06/12/25  0411 06/13/25  0508 06/15/25  0458    138 137 139   K 4.0 3.5 3.7 3.7    99 100 104   CO2 32* 30* 28 29   BUN 9.2 6.5* 6.1* 14.9   CREATININE 0.83 0.69* 0.69* 0.79   GLU  142* 119* 119* 195*   CALCIUM 9.6 9.3 9.0 9.2   MG  --  2.00 1.90  --    ALBUMIN 4.0 3.6  --   --    PROT 8.1 7.5  --   --    ALKPHOS 94 88  --   --    ALT 26 20  --   --    AST 14 13  --   --    BILITOT 0.6 0.7  --   --         Microbiology Results (last 7 days)       Procedure Component Value Units Date/Time    Anaerobic Culture [[id number]]  (Abnormal) Collected: 06/12/25 2001    Order Status: Completed Specimen: Aspirate from Chin Updated: 06/16/25 0958     Anaerobe Culture PROPIONIBACTERIUM SPECIES     Comment: Anaerobic sensitivity is not usually indicated except on single isolates from sterile body sites.       Blood Culture #1 **CANNOT BE ORDERED STAT** [[id number]]  (Normal) Collected: 06/11/25 2042    Order Status: Completed Specimen: Blood Updated: 06/16/25 0102     Blood Culture No Growth At 96 Hours    Blood Culture #2 **CANNOT BE ORDERED STAT** [[id number]]  (Normal) Collected: 06/11/25 2042    Order Status: Completed Specimen: Blood Updated: 06/16/25 0102     Blood Culture No Growth At 96 Hours    Wound Culture [[id number]]  (Abnormal)  (Susceptibility) Collected: 06/12/25 2001    Order Status: Completed Specimen: Abscess from Chin Updated: 06/15/25 0744     Wound Culture Moderate Methicillin resistant Staphylococcus aureus    Anaerobic Culture [[id number]]     Order Status: Canceled Specimen: Aspirate from Chin     Wound Culture [[id number]] Collected: 06/11/25 2016    Order Status: Canceled Specimen: Abscess from Face              CT Maxillofacial With Contrast  Narrative: EXAMINATION:  CT MAXILLOFACIAL WITH CONTRAST    CLINICAL HISTORY:  Maxillary/facial abscess;    TECHNIQUE:  Multidetector axial performed maxillofacial following intravenous administration of contrast images are reconstructed    Dose length product was 1106 mGycm. Automated radiation control was utilized to minimize radiation dose.    COMPARISON:  None available.    FINDINGS:  There is abundant soft tissue inflammation about the chin  which from the midline proceeds both right laterally and left laterally.  There is submental fluid collection measuring 1.8 cm suggestive of an abscess.  There are no adjacent mandible lytic erosive changes to suggest osteomyelitis.    There are no periorbital inflammations.  No postseptal orbital inflammation or abnormal enhancement.  Globes are intact.  No thickening inflammation of the ocular muscles.    The oropharynx, hypopharynx and the larynx is unremarkable.  There is no thickening or inflammation of the epiglottis.  Salivary and the parotid glands appear to be unremarkable.  Right thyroid gland is larger compared left is suspected nodule.  Please further assess with ultrasound exam.    No maxillofacial acute fracture deformity identified.  There is left maxillary sinus small retention cyst.  Bilateral nasal passageways are narrowed due to hypertrophic rhinitis which is more pronounced on the right.  Nasal septum deviates to the left.  There is right middle turbinate josh bullosa.  Impression: 1. Abundant soft tissue inflammation consistent with cellulitis and submental small fluid collection suspected for an abscess.  No adjacent mandible erosive changes or osteomyelitis.    2. Details of other findings above    Electronically signed by: Carlos Iglesias  Date:    06/11/2025  Time:    19:41         Medication List        START taking these medications      doxycycline 100 MG tablet  Commonly known as: VIBRA-TABS  Take 1 tablet (100 mg total) by mouth every 12 (twelve) hours. for 10 days     losartan 25 MG tablet  Commonly known as: COZAAR  Take 1 tablet (25 mg total) by mouth once daily.  Start taking on: June 17, 2025     oxyCODONE-acetaminophen 5-325 mg per tablet  Commonly known as: PERCOCET  Take 1 tablet by mouth every 6 (six) hours as needed for Pain.            CONTINUE taking these medications      mupirocin 2 % ointment  Commonly known as: BACTROBAN  Apply topically 2 (two) times daily. for 7 days                Where to Get Your Medications        These medications were sent to [id number] - [place], [place] - [address] [facility] [address] [facility] [address]  [address] [place] [address]      Hours: 24-hours Phone: [phone]   doxycycline 100 MG tablet  losartan 25 MG tablet  mupirocin 2 % ointment  oxyCODONE-acetaminophen 5-325 mg per tablet          Explained in detail to the patient about the discharge plan, medications, and follow-up visits. Pt understands and agrees with the treatment plan  Discharge Disposition:  Home  Discharged Condition: stable  Diet- cardiac  Dietary Orders (From admission, onward)       Start     Ordered    06/13/25 0406  Diet Adult Regular Isolation Tray - Styrofoam  Diet effective now        Question:  Tray type:  Answer:  Isolation Tray - Styrofoam    06/13/25 0405                   Medications Per DC med rec  Activities as tolerated   Follow-up Information       ATTENTION DISCHARGING NURSE/ Follow up.    Why: Please call referral line at [phone] for new PCP/Hospital follow-up.                         For further questions contact hospitalist office    Discharge time 33 minutes    For worsening symptoms, chest pain, shortness of breath, increased abdominal pain, high grade fever, stroke or stroke like symptoms, immediately go to the nearest Emergency Room or call 911 as soon as possible.      Rajesh Montes M.D, on 6/16/2025. at 1:22 PM.

## 2025-06-16 NOTE — PROGRESS NOTES
[facility]  Hospital Medicine Progress Note        Chief Complaint: Inpatient Follow-up for Abscess (Patient arrives POV, ambulatory in triage. Reports was seen over the weekend for abscess on chin; sent home with Bactrim and face ointment. Symptoms have worsened and swelling to face has increased. Patient is afebrile in triage. VSS. NAD.)    HPI: Jude Lejeune is a 44 y.o. male who  has a past medical history of Renal stones.. The patient presented to [facility] on 6/11/2025 with a primary complaint of redness to face (chin), pain and swelling ongoing x1 month.  Symptoms progressively worsened.  He has a known facial abscess and was placed on Keflex and Bactrim over the past month with no improvement.  Symptoms progressively worsened  which prompted an ED eval.  CT maxillofacial with contrast revealing abundant soft tissue inflammation consistent with cellulitis and submental small fluid collection, suspicious for abscess.  Patient was started on vancomycin and clindamycin.  ENT consulted.  Admitted to hospital medicine services.    Interval Hx:     06/13/2025 Dr. Bell-packing removed by ENT/area inspected with improvement.  Patient also refers much improvement in discomfort/pain.  Continue vancomycin.  Cultures at 12:00 p.m. still negative.  History of MRSA    Six/14/25 Dr. Bell-no new issues.  Cultures with many Staph.  Patient on vancomycin.    06/15/2025 Dr. Bell-dressings removed and area inspected.  Some induration that has improved.  We will discharge tomorrow.    Case was discussed with patient's nurse and  on the floor.    Objective/physical exam:  General appearance: Well-developed, well-nourished male in no apparent distress.  HENT: Atraumatic head. Moist mucous membranes of oral cavity.  Resolved Redness and swelling  to chin improved, area well packed by ENT.  + areas of induration   Eyes: Normal extraocular movements.   Neck: Supple.   Lungs: Clear to  auscultation bilaterally. No wheezing present.   Heart: Regular rate and rhythm. S1 and S2 present with no murmurs/gallop/rub. No pedal edema. No JVD present.   Abdomen: Soft, non-distended, non-tender. No rebound tenderness/guarding. Bowel sounds are normal.   Extremities: No cyanosis, clubbing, or edema.  Skin:  Redness and swelling noted to chin.  Skin intact.  Neuro: Motor and sensory exams grossly intact. Good tone. Muscle strength 5/5 in all 4 extremities  Psych/mental status: Appropriate mood and affect. Responds appropriately to questions.     VITAL SIGNS: 24 HRS MIN & MAX LAST   Temp  Min: 97.6 °F (36.4 °C)  Max: 98.5 °F (36.9 °C) 98.3 °F (36.8 °C)   BP  Min: 112/67  Max: 143/85 119/82   Pulse  Min: 65  Max: 85  82   Resp  Min: 18  Max: 18 18   SpO2  Min: 96 %  Max: 98 % 96 %     I have reviewed the following labs:  Recent Labs   Lab 06/12/25  0411 06/13/25  0508 06/14/25  0442   WBC 12.19* 10.82 5.63   RBC 4.67* 4.67* 4.57*   HGB 14.1 14.3 13.8*   HCT 41.4* 41.8* 40.3*   MCV 88.7 89.5 88.2   MCH 30.2 30.6 30.2   MCHC 34.1 34.2 34.2   RDW 12.2 12.0 12.1    251 247   MPV 9.0 9.3 8.9     Recent Labs   Lab 06/11/25  1526 06/12/25  0411 06/13/25  0508 06/15/25  0458    138 137 139   K 4.0 3.5 3.7 3.7    99 100 104   CO2 32* 30* 28 29   BUN 9.2 6.5* 6.1* 14.9   CREATININE 0.83 0.69* 0.69* 0.79   * 119* 119* 195*   CALCIUM 9.6 9.3 9.0 9.2   MG  --  2.00 1.90  --    ALBUMIN 4.0 3.6  --   --    PROT 8.1 7.5  --   --    ALKPHOS 94 88  --   --    ALT 26 20  --   --    AST 14 13  --   --    BILITOT 0.6 0.7  --   --      Microbiology Results (last 7 days)       Procedure Component Value Units Date/Time    Anaerobic Culture [[id number]] Collected: 06/12/25 2001    Order Status: Completed Specimen: Aspirate from Chin Updated: 06/15/25 0947     Anaerobe Culture No Anaerobes Isolated    Wound Culture [[id number]]  (Abnormal)  (Susceptibility) Collected: 06/12/25 2001    Order Status: Completed  Specimen: Abscess from Chin Updated: 06/15/25 0744     Wound Culture Moderate Methicillin resistant Staphylococcus aureus    Blood Culture #1 **CANNOT BE ORDERED STAT** [[id number]]  (Normal) Collected: 06/11/25 2042    Order Status: Completed Specimen: Blood Updated: 06/15/25 0100     Blood Culture No Growth At 72 Hours    Blood Culture #2 **CANNOT BE ORDERED STAT** [[id number]]  (Normal) Collected: 06/11/25 2042    Order Status: Completed Specimen: Blood Updated: 06/15/25 0100     Blood Culture No Growth At 72 Hours    Anaerobic Culture [[id number]]     Order Status: Canceled Specimen: Aspirate from Chin     Wound Culture [[id number]] Collected: 06/11/25 2016    Order Status: Canceled Specimen: Abscess from Face              See below for Radiology    Assessment/Plan:    Submandibular abscess and cellulitis status post I and D by ENT  MRSA carrier  - cultures pertinent for MRSA     Plan  Doxycycline 100 mg p.o. b.i.d.  Vancomycin, pharmacy to dose  Home tomorrow        VTE prophylaxis:     Patient condition:  Stable    Anticipated discharge and Disposition:   Home      All diagnosis and differential diagnosis have been reviewed; assessment and plan has been documented; I have personally reviewed the labs and test results that are presently available; I have reviewed the patients medication list; I have reviewed the consulting providers response and recommendations. I have reviewed or attempted to review medical records based upon their availability    All of the patient's questions have been  addressed and answered. Patient's is agreeable to the above stated plan. I will continue to monitor closely and make adjustments to medical management as needed.    Portions of this note dictated using EMR integrated voice recognition software, and may be subject to voice recognition errors not corrected at proofreading. Please contact writer for clarification if needed.    _____________________________________________________________________    Malnutrition Status:  Nutrition consulted. Most recent weight and BMI monitored-     Measurements:  Wt Readings from Last 1 Encounters:   06/11/25 103.3 kg (227 lb 11.8 oz)   Body mass index is 28.46 kg/m².    Patient has been screened and assessed by RD.    Malnutrition Type:  Context:    Level:      Malnutrition Characteristic Summary:       Interventions/Recommendations (treatment strategy):        Scheduled Med:   clindamycin IV (PEDS and ADULTS)  600 mg Intravenous Q8H    enoxparin  40 mg Subcutaneous Daily    losartan  25 mg Oral Daily    vancomycin 1,500 mg in D5W 250 mL IVPB (admixture device)  1,500 mg Intravenous Q8H      Continuous Infusions:     PRN Meds:    Current Facility-Administered Medications:     acetaminophen, 1,000 mg, Oral, Q6H PRN    aluminum-magnesium hydroxide-simethicone, 30 mL, Oral, QID PRN    bisacodyL, 10 mg, Rectal, Daily PRN    dextrose 50%, 12.5 g, Intravenous, PRN    dextrose 50%, 25 g, Intravenous, PRN    glucagon (human recombinant), 1 mg, Intramuscular, PRN    glucose, 16 g, Oral, PRN    glucose, 24 g, Oral, PRN    hydrALAZINE, 10 mg, Intravenous, Q4H PRN    HYDROmorphone, 0.5 mg, Intravenous, Q4H PRN    labetalol, 10 mg, Intravenous, Q4H PRN    melatonin, 6 mg, Oral, Nightly PRN    naloxone, 0.02 mg, Intravenous, PRN    ondansetron, 4 mg, Intravenous, Q4H PRN    oxyCODONE-acetaminophen, 1 tablet, Oral, Q6H PRN    prochlorperazine, 5 mg, Intravenous, Q6H PRN    senna-docusate, 1 tablet, Oral, BID PRN    sodium chloride 0.9%, 10 mL, Intravenous, PRN    vancomycin - pharmacy to dose, , Intravenous, pharmacy to manage frequency     Radiology:  I have personally reviewed the following imaging and agree with the radiologist.     CT Maxillofacial With Contrast  Narrative: EXAMINATION:  CT MAXILLOFACIAL WITH CONTRAST    CLINICAL HISTORY:  Maxillary/facial abscess;    TECHNIQUE:  Multidetector axial performed  maxillofacial following intravenous administration of contrast images are reconstructed    Dose length product was 1106 mGycm. Automated radiation control was utilized to minimize radiation dose.    COMPARISON:  None available.    FINDINGS:  There is abundant soft tissue inflammation about the chin which from the midline proceeds both right laterally and left laterally.  There is submental fluid collection measuring 1.8 cm suggestive of an abscess.  There are no adjacent mandible lytic erosive changes to suggest osteomyelitis.    There are no periorbital inflammations.  No postseptal orbital inflammation or abnormal enhancement.  Globes are intact.  No thickening inflammation of the ocular muscles.    The oropharynx, hypopharynx and the larynx is unremarkable.  There is no thickening or inflammation of the epiglottis.  Salivary and the parotid glands appear to be unremarkable.  Right thyroid gland is larger compared left is suspected nodule.  Please further assess with ultrasound exam.    No maxillofacial acute fracture deformity identified.  There is left maxillary sinus small retention cyst.  Bilateral nasal passageways are narrowed due to hypertrophic rhinitis which is more pronounced on the right.  Nasal septum deviates to the left.  There is right middle turbinate josh bullosa.  Impression: 1. Abundant soft tissue inflammation consistent with cellulitis and submental small fluid collection suspected for an abscess.  No adjacent mandible erosive changes or osteomyelitis.    2. Details of other findings above    Electronically signed by: Carlos Iglesias  Date:    06/11/2025  Time:    19:41      Rajesh Bell MD  Department of Hospital Medicine   [facility]   06/15/2025

## 2025-06-17 LAB
BACTERIA BLD CULT: NORMAL
BACTERIA BLD CULT: NORMAL

## 2025-06-25 ENCOUNTER — OFFICE VISIT (OUTPATIENT)
Dept: FAMILY MEDICINE | Facility: CLINIC | Age: 45
End: 2025-06-25
Payer: MEDICAID

## 2025-06-25 VITALS
RESPIRATION RATE: 18 BRPM | TEMPERATURE: 98 F | DIASTOLIC BLOOD PRESSURE: 72 MMHG | HEART RATE: 98 BPM | SYSTOLIC BLOOD PRESSURE: 108 MMHG | HEIGHT: 72 IN | WEIGHT: 240 LBS | BODY MASS INDEX: 32.51 KG/M2 | OXYGEN SATURATION: 98 %

## 2025-06-25 DIAGNOSIS — I10 PRIMARY HYPERTENSION: Primary | ICD-10-CM

## 2025-06-25 DIAGNOSIS — Z22.322 MRSA (METHICILLIN RESISTANT STAPHYLOCOCCUS AUREUS) CARRIER: ICD-10-CM

## 2025-06-25 DIAGNOSIS — Z00.00 ENCOUNTER FOR WELLNESS EXAMINATION: ICD-10-CM

## 2025-06-25 DIAGNOSIS — L03.211 FACIAL CELLULITIS: ICD-10-CM

## 2025-06-25 PROBLEM — L03.213 PRESEPTAL CELLULITIS OF RIGHT EYE: Status: RESOLVED | Noted: 2022-11-04 | Resolved: 2025-06-25

## 2025-06-25 PROBLEM — L03.811: Status: RESOLVED | Noted: 2022-11-04 | Resolved: 2025-06-25

## 2025-06-25 PROBLEM — L02.01 SUBMENTAL ABSCESS: Status: RESOLVED | Noted: 2025-06-16 | Resolved: 2025-06-25

## 2025-06-25 LAB
25(OH)D3+25(OH)D2 SERPL-MCNC: 20 NG/ML (ref 30–80)
ALBUMIN SERPL-MCNC: 4.2 G/DL (ref 3.5–5)
ALBUMIN/GLOB SERPL: 1.2 RATIO (ref 1.1–2)
ALP SERPL-CCNC: 88 UNIT/L (ref 40–150)
ALT SERPL-CCNC: 125 UNIT/L (ref 0–55)
ANION GAP SERPL CALC-SCNC: 6 MEQ/L
AST SERPL-CCNC: 34 UNIT/L (ref 11–45)
BACTERIA #/AREA URNS AUTO: ABNORMAL /HPF
BASOPHILS # BLD AUTO: 0.05 X10(3)/MCL
BASOPHILS NFR BLD AUTO: 1 %
BILIRUB SERPL-MCNC: 0.6 MG/DL
BILIRUB UR QL STRIP.AUTO: NEGATIVE
BUN SERPL-MCNC: 16.5 MG/DL (ref 8.9–20.6)
CALCIUM SERPL-MCNC: 9.5 MG/DL (ref 8.4–10.2)
CHLORIDE SERPL-SCNC: 104 MMOL/L (ref 98–107)
CHOLEST SERPL-MCNC: 284 MG/DL
CHOLEST/HDLC SERPL: 5 {RATIO} (ref 0–5)
CLARITY UR: CLEAR
CO2 SERPL-SCNC: 30 MMOL/L (ref 22–29)
COLOR UR AUTO: YELLOW
CREAT SERPL-MCNC: 0.92 MG/DL (ref 0.72–1.25)
CREAT/UREA NIT SERPL: 18
EOSINOPHIL # BLD AUTO: 0.14 X10(3)/MCL (ref 0–0.9)
EOSINOPHIL NFR BLD AUTO: 2.8 %
ERYTHROCYTE [DISTWIDTH] IN BLOOD BY AUTOMATED COUNT: 12.4 % (ref 11.5–17)
EST. AVERAGE GLUCOSE BLD GHB EST-MCNC: 114 MG/DL
GFR SERPLBLD CREATININE-BSD FMLA CKD-EPI: >60 ML/MIN/1.73/M2
GLOBULIN SER-MCNC: 3.5 GM/DL (ref 2.4–3.5)
GLUCOSE SERPL-MCNC: 99 MG/DL (ref 74–100)
GLUCOSE UR QL STRIP: NORMAL
HAV IGM SERPL QL IA: NONREACTIVE
HBA1C MFR BLD: 5.6 %
HBV CORE IGM SERPL QL IA: NONREACTIVE
HBV SURFACE AG SERPL QL IA: NONREACTIVE
HCT VFR BLD AUTO: 40.3 % (ref 42–52)
HCV AB SERPL QL IA: NONREACTIVE
HDLC SERPL-MCNC: 55 MG/DL (ref 35–60)
HGB BLD-MCNC: 13.5 G/DL (ref 14–18)
HGB UR QL STRIP: NEGATIVE
HIV 1+2 AB+HIV1 P24 AG SERPL QL IA: NONREACTIVE
HYALINE CASTS #/AREA URNS LPF: ABNORMAL /LPF
IMM GRANULOCYTES # BLD AUTO: 0.06 X10(3)/MCL (ref 0–0.04)
IMM GRANULOCYTES NFR BLD AUTO: 1.2 %
KETONES UR QL STRIP: NEGATIVE
LDLC SERPL CALC-MCNC: 210 MG/DL (ref 50–140)
LEUKOCYTE ESTERASE UR QL STRIP: NEGATIVE
LYMPHOCYTES # BLD AUTO: 1.07 X10(3)/MCL (ref 0.6–4.6)
LYMPHOCYTES NFR BLD AUTO: 21.3 %
MCH RBC QN AUTO: 30.6 PG (ref 27–31)
MCHC RBC AUTO-ENTMCNC: 33.5 G/DL (ref 33–36)
MCV RBC AUTO: 91.4 FL (ref 80–94)
MONOCYTES # BLD AUTO: 0.56 X10(3)/MCL (ref 0.1–1.3)
MONOCYTES NFR BLD AUTO: 11.2 %
MUCOUS THREADS URNS QL MICRO: ABNORMAL /LPF
NEUTROPHILS # BLD AUTO: 3.14 X10(3)/MCL (ref 2.1–9.2)
NEUTROPHILS NFR BLD AUTO: 62.5 %
NITRITE UR QL STRIP: NEGATIVE
NRBC BLD AUTO-RTO: 0 %
PH UR STRIP: 5.5 [PH]
PLATELET # BLD AUTO: 255 X10(3)/MCL (ref 130–400)
PMV BLD AUTO: 8.8 FL (ref 7.4–10.4)
POTASSIUM SERPL-SCNC: 4.1 MMOL/L (ref 3.5–5.1)
PROT SERPL-MCNC: 7.7 GM/DL (ref 6.4–8.3)
PROT UR QL STRIP: NEGATIVE
RBC # BLD AUTO: 4.41 X10(6)/MCL (ref 4.7–6.1)
RBC #/AREA URNS AUTO: ABNORMAL /HPF
SODIUM SERPL-SCNC: 140 MMOL/L (ref 136–145)
SP GR UR STRIP.AUTO: 1.03 (ref 1–1.03)
SQUAMOUS #/AREA URNS LPF: ABNORMAL /HPF
T PALLIDUM AB SER QL: NONREACTIVE
T4 FREE SERPL-MCNC: 1.09 NG/DL (ref 0.7–1.48)
TRIGL SERPL-MCNC: 94 MG/DL (ref 34–140)
TSH SERPL-ACNC: 1.59 UIU/ML (ref 0.35–4.94)
UROBILINOGEN UR STRIP-ACNC: NORMAL
VIT B12 SERPL-MCNC: 621 PG/ML (ref 213–816)
VLDLC SERPL CALC-MCNC: 19 MG/DL
WBC # BLD AUTO: 5.02 X10(3)/MCL (ref 4.5–11.5)
WBC #/AREA URNS AUTO: ABNORMAL /HPF

## 2025-06-25 PROCEDURE — 80061 LIPID PANEL: CPT | Performed by: NURSE PRACTITIONER

## 2025-06-25 PROCEDURE — 82306 VITAMIN D 25 HYDROXY: CPT | Performed by: NURSE PRACTITIONER

## 2025-06-25 PROCEDURE — 87389 HIV-1 AG W/HIV-1&-2 AB AG IA: CPT | Performed by: NURSE PRACTITIONER

## 2025-06-25 PROCEDURE — 85025 COMPLETE CBC W/AUTO DIFF WBC: CPT | Performed by: NURSE PRACTITIONER

## 2025-06-25 PROCEDURE — 82607 VITAMIN B-12: CPT | Performed by: NURSE PRACTITIONER

## 2025-06-25 PROCEDURE — 99214 OFFICE O/P EST MOD 30 MIN: CPT | Mod: PBBFAC,PN | Performed by: NURSE PRACTITIONER

## 2025-06-25 PROCEDURE — 83036 HEMOGLOBIN GLYCOSYLATED A1C: CPT | Performed by: NURSE PRACTITIONER

## 2025-06-25 PROCEDURE — 80074 ACUTE HEPATITIS PANEL: CPT | Performed by: NURSE PRACTITIONER

## 2025-06-25 PROCEDURE — 84439 ASSAY OF FREE THYROXINE: CPT | Performed by: NURSE PRACTITIONER

## 2025-06-25 PROCEDURE — 84443 ASSAY THYROID STIM HORMONE: CPT | Performed by: NURSE PRACTITIONER

## 2025-06-25 PROCEDURE — 86780 TREPONEMA PALLIDUM: CPT | Performed by: NURSE PRACTITIONER

## 2025-06-25 PROCEDURE — 80053 COMPREHEN METABOLIC PANEL: CPT | Performed by: NURSE PRACTITIONER

## 2025-06-25 PROCEDURE — 81001 URINALYSIS AUTO W/SCOPE: CPT | Performed by: NURSE PRACTITIONER

## 2025-06-25 NOTE — PROGRESS NOTES
[facility]      Patient Name: Jude Lejeune     : 1980    MRN: [id number]     Subjective:     Patient ID: Jude Lejeune is a 44 y.o. male.    Chief Complaint:   Chief Complaint   Patient presents with    Establish Care     Pt is here to establish care with PCP.         HPI: 25: Pt presents to establish care with PCP.  Newly dx hypertensive disease while in hospital. Pt found to be MRSA carrier.  Due for TDAP with recent facial cellulitis.  Pt was d/c on Losartan from hospital.      Continued facial redness/pustules to chin area s/p Discharge from Hospital.   He still has concerns about the area on his chin that was drained in the hospital because it is still red and is really afraid to shave because he said that is what he feels started all of it in the 1st place.  He does state that he used mupirocin ointment into his nose twice daily for 5 days and has completed that.  He still has 1 dose of doxycycline left to take.  He still has bumps on his shin where the incision was made to drain the lesion and his stent his chin stays red all the time especially when he is in the heat in his sweating.  He was not referred to Infectious Disease while in the hospital.  He would like a referral to Infectious Disease at [facility] to speak to someone about what he can do to prevent further abscesses from occurring on his face.      Interval history: discharged from [facility] 25.   Jude Lejeune is a 44 y.o. male who  has a past medical history of Renal stones.. The patient presented to [facility] on 2025 with a primary complaint of redness to face (chin), pain and swelling ongoing x1 month.  Symptoms progressively worsened.  He has a known facial abscess and was placed on Keflex and Bactrim over the past month with no improvement.  Symptoms progressively worsened  which prompted an ED eval.  CT maxillofacial with contrast revealing abundant soft tissue inflammation consistent with  cellulitis and submental small fluid collection, suspicious for abscess.  Patient was started on vancomycin and clindamycin.  ENT consulted.  Admitted to hospital medicine services.  On admission patient was placed on vancomycin/clindamycin.  Patient with a history of MRSA carrier.  ENT was consulted and patient underwent incision and drainage of abscess over submental area .  According to ENT notes, abscess was loculated.  Cultures were taken and pertinent for MRSA.  Above antibiotics were continued until erythema subsided.  There is no more drainage other than serosanguineous.  There is a small area of remaining induration.  While patient was admitted he exhibited elevated blood pressure.  The patient was placed on Cozaar 25 mg p.o. daily.  Patient will be discharged on doxycycline 100 mg p.o. b.i.d. times 10 days.  Losartan 25 mg were sent to his pharmacy as well as Percocet 5/325.   was consulted for primary care physician.  Patient discharged after improved condition .  Patient to remove packing in 2 days.  No referral made to infectious disease for MRSA    START taking these medications      doxycycline 100 MG tablet  Commonly known as: VIBRA-TABS  Take 1 tablet (100 mg total) by mouth every 12 (twelve) hours. for 10 days     losartan 25 MG tablet  Commonly known as: COZAAR  Take 1 tablet (25 mg total) by mouth once daily.  Start taking on: June 17, 2025     oxyCODONE-acetaminophen 5-325 mg per tablet  Commonly known as: PERCOCET  Take 1 tablet by mouth every 6 (six) hours as needed for Pain.      CT Maxillofacial With Contrast  Narrative: EXAMINATION:  CT MAXILLOFACIAL WITH CONTRAST  CLINICAL HISTORY:  Maxillary/facial abscess;  TECHNIQUE:  Multidetector axial performed maxillofacial following intravenous administration of contrast images are reconstructed  Dose length product was 1106 mGycm. Automated radiation control was utilized to minimize radiation dose.  COMPARISON:  None  available.  FINDINGS:  There is abundant soft tissue inflammation about the chin which from the midline proceeds both right laterally and left laterally.  There is submental fluid collection measuring 1.8 cm suggestive of an abscess.  There are no adjacent mandible lytic erosive changes to suggest osteomyelitis.  There are no periorbital inflammations.  No postseptal orbital inflammation or abnormal enhancement.  Globes are intact.  No thickening inflammation of the ocular muscles.  The oropharynx, hypopharynx and the larynx is unremarkable.  There is no thickening or inflammation of the epiglottis.  Salivary and the parotid glands appear to be unremarkable.  Right thyroid gland is larger compared left is suspected nodule.  Please further assess with ultrasound exam.  No maxillofacial acute fracture deformity identified.  There is left maxillary sinus small retention cyst.  Bilateral nasal passageways are narrowed due to hypertrophic rhinitis which is more pronounced on the right.  Nasal septum deviates to the left.  There is right middle turbinate josh bullosa.  Impression:   1. Abundant soft tissue inflammation consistent with cellulitis and submental small fluid collection suspected for an abscess.  No adjacent mandible erosive changes or osteomyelitis.  2. Details of other findings above            ROS:      Review of Systems   Constitutional: Negative.    HENT: Negative.     Eyes: Negative.    Respiratory: Negative.     Cardiovascular: Negative.    Gastrointestinal: Negative.    Genitourinary: Negative.    Musculoskeletal: Negative.    Skin: Negative.    Neurological: Negative.    Endo/Heme/Allergies: Negative.    Psychiatric/Behavioral: Negative.     All other systems reviewed and are negative.      12 point review of systems conducted, negative except as stated in the history of present illness. See HPI for details.    History:     Health Maintenance         Date Due Completion Date    TETANUS VACCINE  06/25/2026 (Originally 7/31/1998) ---    COVID-19 Vaccine (1 - 2024-25 season) 06/25/2026 (Originally 9/1/2024) ---    Influenza Vaccine (Season Ended) 09/01/2025 ---    Hemoglobin A1c (Diabetic Prevention Screening) 11/04/2025 11/4/2022    Lipid Panel 11/05/2027 11/5/2022    RSV Vaccine (Age 60+ and Pregnant patients) (1 - 1-dose 75+ series) 07/31/2055 ---            Past Medical History:   Diagnosis Date    Cellulitis of postauricular region 11/04/2022    Preseptal cellulitis of right eye 11/04/2022    Renal stones     Submental abscess 06/16/2025        Past Surgical History:   Procedure Laterality Date    KIDNEY STONE SURGERY         Family History   Problem Relation Name Age of Onset    No Known Problems Mother      No Known Problems Father      No Known Problems Sister          Social History     Tobacco Use    Smoking status: Former     Types: Cigarettes     Start date: 01/2025     Quit date: 2015     Years since quitting: 10.4     Passive exposure: Past    Smokeless tobacco: Never   Substance and Sexual Activity    Alcohol use: Not Currently    Drug use: Never    Sexual activity: Not on file       Current Outpatient Medications   Medication Instructions    doxycycline (VIBRA-TABS) 100 mg, Oral, Every 12 hours    losartan (COZAAR) 25 mg, Oral, Daily        Review of patient's allergies indicates:  No Known Allergies    Patient Care Team:  Ysabel Goodwin FNP as PCP - General (Family Medicine)    Objective:     Visit Vitals  /72   Pulse 98   Temp 98.1 °F (36.7 °C) (Oral)   Resp 18   Ht 6' (1.829 m)   Wt 108.9 kg (240 lb)   SpO2 98%   BMI 32.55 kg/m²       Physical Examination:     Physical Exam  Vitals and nursing note reviewed.   Constitutional:       General: He is awake.      Appearance: Normal appearance. He is well-developed and well-groomed.   HENT:      Head: Normocephalic and atraumatic.      Right Ear: Hearing, tympanic membrane, ear canal and external ear normal.      Left Ear: Hearing,  tympanic membrane, ear canal and external ear normal.      Nose: Nose normal.      Mouth/Throat:      Lips: Pink.      Mouth: Mucous membranes are moist.      Tongue: No lesions.      Pharynx: Oropharynx is clear. No posterior oropharyngeal erythema.   Eyes:      General: Lids are normal. Vision grossly intact.      Extraocular Movements: Extraocular movements intact.      Conjunctiva/sclera: Conjunctivae normal.      Pupils: Pupils are equal, round, and reactive to light.   Neck:      Thyroid: No thyroid mass.      Vascular: No carotid bruit.      Trachea: Trachea and phonation normal.   Cardiovascular:      Rate and Rhythm: Normal rate and regular rhythm.      Pulses: Normal pulses.      Heart sounds: Normal heart sounds, S1 normal and S2 normal.   Pulmonary:      Effort: Pulmonary effort is normal.      Breath sounds: Normal breath sounds. No decreased breath sounds, wheezing, rhonchi or rales.   Abdominal:      General: Abdomen is flat. Bowel sounds are normal.      Palpations: Abdomen is soft.      Tenderness: There is no abdominal tenderness.   Musculoskeletal:         General: Normal range of motion.      Cervical back: Full passive range of motion without pain and normal range of motion.      Right lower leg: No edema.      Left lower leg: No edema.   Lymphadenopathy:      Cervical: No cervical adenopathy.   Skin:     General: Skin is warm and dry.      Capillary Refill: Capillary refill takes less than 2 seconds.   Neurological:      General: No focal deficit present.      Mental Status: He is alert and oriented to person, place, and time.      GCS: GCS eye subscore is 4. GCS verbal subscore is 5. GCS motor subscore is 6.      Cranial Nerves: Cranial nerves 2-12 are intact.      Sensory: Sensation is intact.      Motor: Motor function is intact.      Coordination: Coordination is intact.      Gait: Gait is intact.   Psychiatric:         Attention and Perception: Attention and perception normal.         Mood  "and Affect: Mood normal.         Speech: Speech normal.         Behavior: Behavior normal. Behavior is cooperative.         Thought Content: Thought content normal.         Cognition and Memory: Cognition and memory normal.         Lab Results:     Chemistry:  Lab Results   Component Value Date     06/15/2025    K 3.7 06/15/2025    BUN 14.9 06/15/2025    CREATININE 0.79 06/15/2025    EGFRNORACEVR >60 06/15/2025    CALCIUM 9.2 06/15/2025    ALKPHOS 88 06/12/2025    ALBUMIN 3.6 06/12/2025    AST 13 06/12/2025    ALT 20 06/12/2025    MG 1.90 06/13/2025    PHOS 3.4 11/06/2022    TSH 1.9412 11/04/2022    [id number] 0.93 11/04/2022        Lab Results   Component Value Date    HGBA1C 5.2 11/04/2022        Hematology:  Lab Results   Component Value Date    WBC 5.63 06/14/2025    HGB 13.8 (L) 06/14/2025    HCT 40.3 (L) 06/14/2025     06/14/2025       Lipid Panel:  Lab Results   Component Value Date    CHOL 189 11/05/2022    HDL 35 11/05/2022    .00 11/05/2022    TRIG 70 11/05/2022    TOTALCHOLEST 5 11/05/2022        Urine:  No results found for: "COLORUA", "APPEARANCEUA", "SGUA", "PHUA", "PROTEINUA", "GLUCOSEUA", "KETONESUA", "BLOODUA", "NITRITESUA", "LEUKOCYTESUR", "RBCUA", "WBCUA", "BACTERIA", "SQEPUA", "HYALINECASTS", "CREATRANDUR", "PROTEINURINE", "UPROTCREA"     Assessment:          ICD-10-CM ICD-9-CM   1. Primary hypertension  I10 401.9   2. MRSA (methicillin resistant Staphylococcus aureus) carrier  Z22.322 V02.54   3. Encounter for wellness examination  Z00.00 V70.0   4. Facial cellulitis  L03.211 682.0          Plan:     1. Primary hypertension  Overview:  BP Readings from Last 3 Encounters:   06/25/25 108/72   06/16/25 (!) 147/92   06/07/25 (!) 145/104         Assessment & Plan:  Continue Losartan at 25 mg po daily. If lightheadedness or dizziness occurs, please stop the medication and reach out to clinic.   -Low Sodium Diet (Dash Diet - less than 2 grams of sodium per day).  -Monitor Blood " Pressure daily and log. Report any consistent numbers greater than 140/90.  -Smoking Cessation encouraged to aid in BP reduction.  -Maintain healthy weight with goal BMI <30. Exercise 30 minutes per day 5 days per week        2. MRSA (methicillin resistant Staphylococcus aureus) carrier  -     Ambulatory referral/consult to Infectious Disease; Future; Expected date: 07/02/2025    3. Encounter for wellness examination  -     CBC Auto Differential  -     Comprehensive Metabolic Panel  -     Urinalysis  -     Hemoglobin A1C  -     TSH  -     T4, Free  -     Vitamin D  -     Vitamin B12  -     Hepatitis Panel, Acute  -     HIV 1/2 Ag/Ab (4th Gen)  -     SYPHILIS ANTIBODY (WITH REFLEX RPR)  -     Lipid Panel    4. Facial cellulitis  -     Ambulatory referral/consult to Infectious Disease; Future; Expected date: 07/02/2025           Follow up in about 2 weeks (around 7/9/2025) for Review of labs..  In addition to their scheduled follow up, the patient has also been instructed to follow up on as needed basis.       No future appointments.       RAMÓN Mock

## 2025-06-25 NOTE — PATIENT INSTRUCTIONS
Fahad Molina,     If you are due for any health screening(s) below please notify me so we can arrange them to be ordered and scheduled. Most healthy patients at your age complete them, but you are free to accept or refuse.     If you can't do it, I'll definitely understand. If you can, I'd certainly appreciate it!    Tests to Keep You Healthy    Last Blood Pressure <= 139/89 (6/25/2025): NO

## 2025-06-25 NOTE — ASSESSMENT & PLAN NOTE
Continue Losartan at 25 mg po daily. If lightheadedness or dizziness occurs, please stop the medication and reach out to clinic.   -Low Sodium Diet (Dash Diet - less than 2 grams of sodium per day).  -Monitor Blood Pressure daily and log. Report any consistent numbers greater than 140/90.  -Smoking Cessation encouraged to aid in BP reduction.  -Maintain healthy weight with goal BMI <30. Exercise 30 minutes per day 5 days per week

## 2025-07-01 RX ORDER — MUPIROCIN 20 MG/G
1 OINTMENT TOPICAL 2 TIMES DAILY
Qty: 30 G | Refills: 11 | Status: SHIPPED | OUTPATIENT
Start: 2025-07-01

## 2025-07-01 RX ORDER — DOXYCYCLINE HYCLATE 100 MG
100 TABLET ORAL 2 TIMES DAILY
COMMUNITY
End: 2025-07-01 | Stop reason: SDUPTHER

## 2025-07-01 RX ORDER — DOXYCYCLINE HYCLATE 100 MG
100 TABLET ORAL 2 TIMES DAILY
Qty: 14 TABLET | Refills: 0 | Status: SHIPPED | OUTPATIENT
Start: 2025-07-01 | End: 2025-07-08

## 2025-07-01 RX ORDER — MUPIROCIN 20 MG/G
1 OINTMENT TOPICAL 2 TIMES DAILY
COMMUNITY
End: 2025-07-01 | Stop reason: SDUPTHER

## 2025-07-01 RX ORDER — DOXYCYCLINE HYCLATE 100 MG
100 TABLET ORAL 2 TIMES DAILY
Qty: 14 TABLET | Refills: 0 | Status: CANCELLED | OUTPATIENT
Start: 2025-07-01 | End: 2025-07-08

## 2025-07-01 RX ORDER — MUPIROCIN 20 MG/G
1 OINTMENT TOPICAL 2 TIMES DAILY
Qty: 14 G | Refills: 0 | Status: CANCELLED | OUTPATIENT
Start: 2025-07-01 | End: 2025-07-08

## 2025-07-01 NOTE — TELEPHONE ENCOUNTER
Copied from CRM #[id number]. Topic: Medications - Medication Status Check   >> Jul 1, 2025  1:37 PM Vida wrote:  Who Called: Jude Lejeune    Refill or New Rx:Refill    RX Name and Strength:   doxycycline 100 MG tablet  mupirocin (BACTROBAN) 2 % ointment [Pharmacy Med Name: MUPIROCIN 2% OINTMENT 15GM]    How is the patient currently taking it? (ex. 1XDay): n/a    Is this a 30 day or 90 day RX:n/a    Local or Mail Order: LOCAL    List of preferred pharmacies on file (remove unneeded): [id number] [address] [place] [address] AT [facility]   Phone: [phone]  Fax: [phone]    Ordering Provider: KENNEDI MCKEE    Preferred Method of Contact: phone call    Patient's Preferred Phone Number on File: [phone]     Best Call Back Number, if different: n/a    Additional Information: pt is FU on the above 2 Rxs. The doxycycline 100 MG tablet was supposed to be filled since his last visit on 06/25.     Please advise pt

## 2025-07-01 NOTE — TELEPHONE ENCOUNTER
Copied from CRM #[id number]. Topic: General Inquiry - Patient Advice  >> Jun 30, 2025  2:21 PM Ruth wrote:  .Who Called: Jude Lejeune    Caller is requesting assistance/information from provider's office.    Symptoms (please be specific): N/A   How long has patient had these symptoms: N/A  List of preferred pharmacies on file (remove unneeded): [unfilled]  If different, enter pharmacy into here including location and phone number: N/A    Preferred Method of Contact: Phone Call    Patient's Preferred Phone Number on File: [phone]     Best Call Back Number, if different:    Additional Information: Pt called stated he was just speaking with Adrianne regarding his medication. Pt called back to give the names and strength of his medications that are needing to be refilled.Pt is needing doxycycline  MGS take one every 12 hrs and mupirocin 2% 15g tube. Please advise, thank you.

## 2025-07-01 NOTE — PROGRESS NOTES
I have sent medications and/or lab orders in for this patient.  Please notify the patient.     No orders of the defined types were placed in this encounter.      Medications Ordered This Encounter   Medications    doxycycline (VIBRA-TABS) 100 MG tablet     Sig: Take 1 tablet (100 mg total) by mouth 2 (two) times daily. for 7 days     Dispense:  14 tablet     Refill:  0    mupirocin (BACTROBAN) 2 % ointment     Sig: Apply 1 Tube topically 2 (two) times daily.     Dispense:  30 g     Refill:  11

## 2025-07-08 ENCOUNTER — TELEPHONE (OUTPATIENT)
Dept: FAMILY MEDICINE | Facility: CLINIC | Age: 45
End: 2025-07-08
Payer: MEDICAID

## 2025-07-10 ENCOUNTER — OFFICE VISIT (OUTPATIENT)
Dept: FAMILY MEDICINE | Facility: CLINIC | Age: 45
End: 2025-07-10
Payer: MEDICAID

## 2025-07-10 VITALS
OXYGEN SATURATION: 98 % | SYSTOLIC BLOOD PRESSURE: 133 MMHG | HEIGHT: 72 IN | RESPIRATION RATE: 18 BRPM | WEIGHT: 240 LBS | HEART RATE: 98 BPM | BODY MASS INDEX: 32.51 KG/M2 | DIASTOLIC BLOOD PRESSURE: 88 MMHG | TEMPERATURE: 98 F

## 2025-07-10 DIAGNOSIS — E55.9 VITAMIN D DEFICIENCY: ICD-10-CM

## 2025-07-10 DIAGNOSIS — I10 PRIMARY HYPERTENSION: Primary | ICD-10-CM

## 2025-07-10 DIAGNOSIS — E78.00 HYPERCHOLESTEROLEMIA: ICD-10-CM

## 2025-07-10 DIAGNOSIS — Z22.322 MRSA (METHICILLIN RESISTANT STAPHYLOCOCCUS AUREUS) CARRIER: ICD-10-CM

## 2025-07-10 PROCEDURE — 99214 OFFICE O/P EST MOD 30 MIN: CPT | Mod: S$PBB,,, | Performed by: NURSE PRACTITIONER

## 2025-07-10 PROCEDURE — 1160F RVW MEDS BY RX/DR IN RCRD: CPT | Mod: CPTII,,, | Performed by: NURSE PRACTITIONER

## 2025-07-10 PROCEDURE — 3008F BODY MASS INDEX DOCD: CPT | Mod: CPTII,,, | Performed by: NURSE PRACTITIONER

## 2025-07-10 PROCEDURE — 4010F ACE/ARB THERAPY RXD/TAKEN: CPT | Mod: CPTII,,, | Performed by: NURSE PRACTITIONER

## 2025-07-10 PROCEDURE — 99214 OFFICE O/P EST MOD 30 MIN: CPT | Mod: PBBFAC,PN | Performed by: NURSE PRACTITIONER

## 2025-07-10 PROCEDURE — 3079F DIAST BP 80-89 MM HG: CPT | Mod: CPTII,,, | Performed by: NURSE PRACTITIONER

## 2025-07-10 PROCEDURE — 1111F DSCHRG MED/CURRENT MED MERGE: CPT | Mod: CPTII,,, | Performed by: NURSE PRACTITIONER

## 2025-07-10 PROCEDURE — 1159F MED LIST DOCD IN RCRD: CPT | Mod: CPTII,,, | Performed by: NURSE PRACTITIONER

## 2025-07-10 PROCEDURE — 3044F HG A1C LEVEL LT 7.0%: CPT | Mod: CPTII,,, | Performed by: NURSE PRACTITIONER

## 2025-07-10 PROCEDURE — 3075F SYST BP GE 130 - 139MM HG: CPT | Mod: CPTII,,, | Performed by: NURSE PRACTITIONER

## 2025-07-10 RX ORDER — ASPIRIN 325 MG
50000 TABLET, DELAYED RELEASE (ENTERIC COATED) ORAL
Qty: 12 CAPSULE | Refills: 0 | Status: SHIPPED | OUTPATIENT
Start: 2025-07-10

## 2025-07-10 NOTE — PROGRESS NOTES
[facility]      Patient Name: Jude Lejeune     : 1980    MRN: [id number]     Subjective:     Patient ID: Jude Lejeune is a 44 y.o. male.    Chief Complaint:   Chief Complaint   Patient presents with    Follow-up     Pt is here for follow up        HPI: 7/10/25: Review of labs.  Patient's face has improved greatly since his last appointment.  Redness on chin has diminished. He has appt with ID clinic in August for FU.  Labs reveal high cholesterol with  and Total Chol 284.  Pt states his diet is horrible.  He knows that he needs to adjust and make changes.  His A1c although normal was 5.6 so we discussed prediabetes and poor diet.  His father had surgery for blockages in the past.  He is not a smoker.  Denies pain          ROS:      Review of Systems   All other systems reviewed and are negative.      12 point review of systems conducted, negative except as stated in the history of present illness. See HPI for details.    History:     Health Maintenance         Date Due Completion Date    High Dose Statin Never done ---    TETANUS VACCINE 2026 (Originally 1998) ---    COVID-19 Vaccine ( - -25 season) 2026 (Originally 2024) ---    Influenza Vaccine (1) 2025 ---    Hemoglobin A1c (Diabetic Prevention Screening) 2028    Lipid Panel 2030    RSV Vaccine (Age 60+ and Pregnant patients) (1 - 1-dose 75+ series) 2055 ---            Past Medical History:   Diagnosis Date    Cellulitis of postauricular region 2022    Preseptal cellulitis of right eye 2022    Renal stones     Submental abscess 2025        Past Surgical History:   Procedure Laterality Date    KIDNEY STONE SURGERY         Family History   Problem Relation Name Age of Onset    No Known Problems Mother      No Known Problems Father      No Known Problems Sister          Social History     Tobacco Use    Smoking status: Former      Types: Cigarettes     Start date: 01/2025     Quit date: 2015     Years since quitting: 10.5     Passive exposure: Past    Smokeless tobacco: Never   Substance and Sexual Activity    Alcohol use: Not Currently    Drug use: Never    Sexual activity: Not on file       Current Outpatient Medications   Medication Instructions    cholecalciferol (vitamin D3) 50,000 Units, Oral, Every 7 days    losartan (COZAAR) 25 mg, Oral, Daily    mupirocin (BACTROBAN) 2 % ointment 1 Tube, Topical (Top), 2 times daily        Review of patient's allergies indicates:  No Known Allergies    Patient Care Team:  Ysabel Goodwin FNP as PCP - General (Family Medicine)    Objective:     Visit Vitals  /88   Pulse 98   Temp 98.1 °F (36.7 °C) (Oral)   Resp 18   Ht 6' (1.829 m)   Wt 108.9 kg (240 lb)   SpO2 98%   BMI 32.55 kg/m²       Physical Examination:     Physical Exam  Vitals reviewed.   Constitutional:       Appearance: Normal appearance. He is normal weight.   HENT:      Head: Normocephalic.   Cardiovascular:      Rate and Rhythm: Normal rate and regular rhythm.      Pulses: Normal pulses.      Heart sounds: Normal heart sounds.   Pulmonary:      Effort: Pulmonary effort is normal.      Breath sounds: Normal breath sounds.   Abdominal:      General: Abdomen is flat.      Palpations: Abdomen is soft.   Musculoskeletal:         General: Normal range of motion.      Cervical back: Normal range of motion.   Skin:     General: Skin is warm and dry.   Neurological:      Mental Status: He is alert.   Psychiatric:         Mood and Affect: Mood normal.         Lab Results:     Chemistry:  Lab Results   Component Value Date     06/25/2025    K 4.1 06/25/2025    BUN 16.5 06/25/2025    CREATININE 0.92 06/25/2025    EGFRNORACEVR >60 06/25/2025    CALCIUM 9.5 06/25/2025    ALKPHOS 88 06/25/2025    ALBUMIN 4.2 06/25/2025    AST 34 06/25/2025     (H) 06/25/2025    MG 1.90 06/13/2025    PHOS 3.4 11/06/2022    [id number] 20 (L)  06/25/2025    TSH 1.593 06/25/2025    [id number] 1.09 06/25/2025        Lab Results   Component Value Date    HGBA1C 5.6 06/25/2025        Hematology:  Lab Results   Component Value Date    WBC 5.02 06/25/2025    HGB 13.5 (L) 06/25/2025    HCT 40.3 (L) 06/25/2025     06/25/2025       Lipid Panel:  Lab Results   Component Value Date    CHOL 284 (H) 06/25/2025    HDL 55 06/25/2025    .00 (H) 06/25/2025    TRIG 94 06/25/2025    TOTALCHOLEST 5 06/25/2025        Urine:  Lab Results   Component Value Date    APPEARANCEUA Clear 06/25/2025    SGUA 1.026 06/25/2025    PROTEINUA Negative 06/25/2025    KETONESUA Negative 06/25/2025    LEUKOCYTESUR Negative 06/25/2025    RBCUA 0-5 06/25/2025    WBCUA 0-5 06/25/2025    BACTERIA None Seen 06/25/2025    SQEPUA None Seen 06/25/2025    HYALINECASTS None Seen 06/25/2025        Assessment:          ICD-10-CM ICD-9-CM   1. Primary hypertension  I10 401.9   2. Vitamin D deficiency  E55.9 268.9   3. Hypercholesterolemia  E78.00 272.0   4. MRSA (methicillin resistant Staphylococcus aureus) carrier  Z22.322 V02.54          Plan:     1. Primary hypertension  Overview:  BP Readings from Last 3 Encounters:   07/10/25 133/88   06/25/25 108/72   06/16/25 (!) 147/92         Assessment & Plan:  Continue Losartan at 25 mg po daily. If lightheadedness or dizziness occurs, please stop the medication and reach out to clinic.   -Low Sodium Diet (Dash Diet - less than 2 grams of sodium per day).  -Monitor Blood Pressure daily and log. Report any consistent numbers greater than 140/90.  -Smoking Cessation encouraged to aid in BP reduction.  -Maintain healthy weight with goal BMI <30. Exercise 30 minutes per day 5 days per week        2. Vitamin D deficiency  Assessment & Plan:  Your vitamin D level was low at  20.     It should be over 30.  This can lead to depression, fatigue and bone pain if too low and not supplemented.  We typically get Vitamin D from the sun and milk/dairy products.   On your own, you can increase foods high in Vitamin D such as fish oil, cod liver oil, salmon, milk fortified with vitamin D.    Today, a prescription for high dose Vitamin D3 65136 IU, to be taken weekly for 12 weeks, has been sent to your pharmacy. Please complete entire 12 weeks of Vitamin D prescription. Your insurance may only cover 4 pills at a time but the prescription is for 12 so you will have to refill it each time you need the additional 4 pills to complete the prescription.     After completion of this prescription (12 weeks/3 months), you may begin taking Vitamin D 2000 I.U. tablets daily (purchase over the counter).    We will repeat a Vitamin D level in the future to ensure proper supplementation.       Orders:  -     cholecalciferol, vitamin D3, 1,250 mcg (50,000 unit) capsule; Take 1 capsule (50,000 Units total) by mouth every 7 days.  Dispense: 12 capsule; Refill: 0  -     Vitamin D; Future; Expected date: 01/10/2026    3. Hypercholesterolemia  Assessment & Plan:  Through shared decision making we have decided that pt will try and lower on his own. Repeat FLP in 6 months fasting.     Orders:  -     Lipid Panel; Future; Expected date: 01/10/2026    4. MRSA (methicillin resistant Staphylococcus aureus) carrier  Assessment & Plan:  Pt has appointment with ID clinic in August.              Follow up in about 6 months (around 1/10/2026) for Hypercholesterolemia with fasting labs, HTN, Vitamin D def..  In addition to their scheduled follow up, the patient has also been instructed to follow up on as needed basis.       Future Appointments   Date Time Provider Department Center   8/19/2025  8:40 AM RESIDENT, [facility] INFECTIOUS DISEASE [facility]   1/12/2026  7:40 AM Ysabel Goodwin FNP [facility]        RAMÓN Mock

## 2025-07-10 NOTE — ASSESSMENT & PLAN NOTE
Your vitamin D level was low at  20.     It should be over 30.  This can lead to depression, fatigue and bone pain if too low and not supplemented.  We typically get Vitamin D from the sun and milk/dairy products.  On your own, you can increase foods high in Vitamin D such as fish oil, cod liver oil, salmon, milk fortified with vitamin D.    Today, a prescription for high dose Vitamin D3 65943 IU, to be taken weekly for 12 weeks, has been sent to your pharmacy. Please complete entire 12 weeks of Vitamin D prescription. Your insurance may only cover 4 pills at a time but the prescription is for 12 so you will have to refill it each time you need the additional 4 pills to complete the prescription.     After completion of this prescription (12 weeks/3 months), you may begin taking Vitamin D 2000 I.U. tablets daily (purchase over the counter).    We will repeat a Vitamin D level in the future to ensure proper supplementation.

## 2025-07-10 NOTE — ASSESSMENT & PLAN NOTE
Through shared decision making we have decided that pt will try and lower on his own. Repeat FLP in 6 months fasting.

## 2025-07-10 NOTE — PATIENT INSTRUCTIONS
Fahad Molina,     If you are due for any health screening(s) below please notify me so we can arrange them to be ordered and scheduled. Most healthy patients at your age complete them, but you are free to accept or refuse.     If you can't do it, I'll definitely understand. If you can, I'd certainly appreciate it!    All of your core healthy metrics are met.

## 2025-08-19 ENCOUNTER — OFFICE VISIT (OUTPATIENT)
Dept: INFECTIOUS DISEASES | Facility: CLINIC | Age: 45
End: 2025-08-19
Payer: MEDICAID

## 2025-08-19 VITALS
DIASTOLIC BLOOD PRESSURE: 80 MMHG | TEMPERATURE: 98 F | HEART RATE: 98 BPM | WEIGHT: 233.44 LBS | BODY MASS INDEX: 31.62 KG/M2 | RESPIRATION RATE: 16 BRPM | HEIGHT: 72 IN | SYSTOLIC BLOOD PRESSURE: 135 MMHG

## 2025-08-19 DIAGNOSIS — E55.9 VITAMIN D DEFICIENCY: ICD-10-CM

## 2025-08-19 DIAGNOSIS — E78.00 HYPERCHOLESTEROLEMIA: ICD-10-CM

## 2025-08-19 DIAGNOSIS — Z22.322 MRSA (METHICILLIN RESISTANT STAPHYLOCOCCUS AUREUS) CARRIER: Primary | ICD-10-CM

## 2025-08-19 PROCEDURE — 99214 OFFICE O/P EST MOD 30 MIN: CPT | Mod: PBBFAC

## 2025-08-19 RX ORDER — MUPIROCIN 20 MG/G
1 OINTMENT TOPICAL 2 TIMES DAILY
Qty: 30 G | Refills: 3 | Status: SHIPPED | OUTPATIENT
Start: 2025-08-19

## 2025-08-19 RX ORDER — MUPIROCIN 20 MG/G
1 OINTMENT TOPICAL 2 TIMES DAILY
Qty: 30 G | Refills: 3 | Status: SHIPPED | OUTPATIENT
Start: 2025-08-19 | End: 2025-08-19

## 2025-08-19 RX ORDER — CHLORHEXIDINE GLUCONATE 40 MG/ML
SOLUTION TOPICAL DAILY PRN
Qty: 3785 ML | Refills: 0 | Status: SHIPPED | OUTPATIENT
Start: 2025-08-19